# Patient Record
Sex: MALE | Race: BLACK OR AFRICAN AMERICAN | Employment: OTHER | URBAN - METROPOLITAN AREA
[De-identification: names, ages, dates, MRNs, and addresses within clinical notes are randomized per-mention and may not be internally consistent; named-entity substitution may affect disease eponyms.]

---

## 2017-12-13 ENCOUNTER — HOSPITAL ENCOUNTER (OUTPATIENT)
Dept: PHYSICAL THERAPY | Age: 52
Discharge: HOME | End: 2017-12-13
Payer: COMMERCIAL

## 2017-12-13 PROCEDURE — 97016 VASOPNEUMATIC DEVICE THERAPY: CPT

## 2017-12-13 PROCEDURE — 97161 PT EVAL LOW COMPLEX 20 MIN: CPT

## 2017-12-13 PROCEDURE — 97110 THERAPEUTIC EXERCISES: CPT

## 2017-12-13 NOTE — PROGRESS NOTES
Ambulatory/Rehab Services H2 Model Falls Risk Assessment    Risk Factor Pts. ·   Confusion/Disorientation/Impulsivity  []    4 ·   Symptomatic Depression  []   2 ·   Altered Elimination  []   1 ·   Dizziness/Vertigo  []   1 ·   Gender (Male)  [x]   1 ·   Any administered antiepileptics (anticonvulsants):  []   2 ·   Any administered benzodiazepines:  []   1 ·   Visual Impairment (specify):  []   1 ·   Portable Oxygen Use  []   1 ·   Orthostatic ? BP  []   1 ·   History of Recent Falls (within 3 mos.)  []   5     Ability to Rise from Chair (choose one) Pts. ·   Ability to rise in a single movement  []   0 ·   Pushes up, successful in one attempt  []   1 ·   Multiple attempts, but successful  []   3 ·   Unable to rise without assistance  []   4   Total: (5 or greater = High Risk) 1     Falls Prevention Plan:   []                Physical Limitations to Exercise (specify):   []                Mobility Assistance Device (type):   []                Exercise/Equipment Adaptation (specify):    ©2010 [facility] Patent #[id number].  Federal Law prohibits the replication, distribution or use without written permission from [facility]

## 2017-12-13 NOTE — THERAPY EVALUATION
Aba Lloyd Show  : 1965 [facility]  [address], [address], [place] [address]  [phone]   [phone]        OUTPATIENT PHYSICAL THERAPY:Initial Assessment 2017   ICD-10: Treatment Diagnosis: Pain in left shoulder (M25.512)  Precautions/Allergies:   Review of patient's allergies indicates no known allergies. Fall Risk Score: 1 (? 5 = High Risk)  MD Orders: evaluate and treat: HEP MEDICAL/REFERRING DIAGNOSIS:  shoulder, left   DATE OF ONSET: --  REFERRING PHYSICIAN: Eneida Torres MD  RETURN PHYSICIAN APPOINTMENT: --     INITIAL ASSESSMENT:  Mr. Yajaira Snyder presents with left shoulder pain and associated limitations in strength and functional movement. The order initially stated 1-2 PT visits, he has been scheduled for one follow up PT visit. Depending on his progress he may benefit from continuing physical therapy 1-2 times per week for 5 more weeks for further physical therapy visits to address dysfunctions and pain. PROBLEM LIST (Impacting functional limitations):  1. Decreased Strength  2. Decreased ADL/Functional Activities  3. Increased Pain  4. Decreased [place] with Home Exercise Program INTERVENTIONS PLANNED:  1. Home Exercise Program (HEP)  2. Manual Therapy including joint and soft tissue manipulation and mobilization, and dry needling  3. Therapeutic Exercise/Strengthening  4. Modalities including heat/cold application and electrical stimulation   TREATMENT PLAN:  Effective Dates: 17 TO 18. Frequency/Duration: 1-2 time a week for 5 weeks  GOALS: (Goals have been discussed and agreed upon with patient.)  Short-Term Functional Goals: Time Frame: 5 weeks  1. Pt will be independent with > or = 2 exercises in HEP. 2. Pt will report < or = 28 on DASH. Discharge Goals: Time Frame: 10 weeks  1. Pt will be independent with > or = 4 exercises in HEP. 2. Pt will report < or = 19 on DASH.    Rehabilitation Potential For Stated Goals: Good  Regarding Aba Vega's therapy, I certify that the treatment plan above will be carried out by a therapist or under their direction. Thank you for this referral,  Lolis Ferreira, PT, DPT                 The information in this section was collected on 12/13/17 (except where otherwise noted). HISTORY:   History of Present Injury/Illness (Reason for Referral):  Pt reports previous history of L AC separation in 1991. Has recently started getting left shoulder pain that affects sleep and is worse at night. Received a cortisone injection on 12/12/17. Had R rotator cuff sx 2001. Has had an X-ray, no MRI. The shoulder pain affects sleep and general functional activities. Past Medical History/Comorbidities:   Mr. Ousmane Balbuena  has a past medical history of Hypertension and Migraine. He also has no past medical history of Arrhythmia; Arthritis; Asthma; Autoimmune disease ([address] CAD (coronary artery disease); Cancer ([address] Chronic kidney disease; Chronic pain; Coagulation defects; COPD; Diabetes ([address] GERD (gastroesophageal reflux disease); Heart failure ([address] Liver disease; Other ill-defined conditions(799.89); Psychiatric disorder; PUD (peptic ulcer disease); Seizures ([address] Stroke [facility]); Thromboembolus ([address] or Thyroid disease. Mr. Ousmane Balbuena  has a past surgical history that includes orthopaedic.   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 Domestic Life  3. Community, Social and [place]   Number of elements (examined above) that affect the Plan of Care: 1-2: LOW COMPLEXITY   CLINICAL PRESENTATION:   Presentation: Stable and uncomplicated: LOW COMPLEXITY   CLINICAL DECISION MAKING:   Outcome Measure: Tool Used: Disabilities of the Arm, Shoulder and Hand (DASH) Questionnaire - Quick Version  Score:  Initial: 33/55  Most Recent: X/55 (Date: -- )   Interpretation of Score: The DASH is designed to measure the activities of daily living in person's with upper extremity dysfunction or pain. Each section is scored on a 1-5 scale, 5 representing the greatest disability. The scores of each section are added together for a total score of 55. Score 11 12-19 20-28 29-37 38-45 46-54 55   Modifier CH CI CJ CK CL CM CN       Medical Necessity:   · Skilled intervention continues to be required due to decreased function. Reason for Services/Other Comments:  · Patient continues to require skilled intervention due to decreased function. Use of outcome tool(s) and clinical judgement create a POC that gives a: Clear prediction of patient's progress: LOW COMPLEXITY            TREATMENT:   (In addition to Assessment/Re-Assessment sessions the following treatments were rendered)  Pre-treatment Symptoms/Complaints:  Low level pain sitting during intake, increased pain with movement  Pain: Initial:     2/10 Post Session:  0/10     THERAPEUTIC EXERCISE: (15 minutes):  Exercises per grid below to improve mobility and strength. Date:  12/13 Date:   Date:     Activity/Exercise Parameters Parameters Parameters   Patient education Sleeping posture     SL ER Focus on eccentric lowering  X 10                                       MANUAL THERAPY: ( minutes):  To increase motion and reduce pain    MODALITIES: ( 10 minutes):   - GameReady to L shoulder medium compression     Treatment/Session Assessment:    · Response to Treatment:  Pt demonstrated understanding of exercise, pain came on after 5 repetitions, tolerated 10 repetitions. · Compliance with Program/Exercises: Will assess as treatment progresses. · Recommendations/Intent for next treatment session: \"Next visit will focus on advancements to more challenging activities\".   Total Treatment Duration:  PT Patient Time In/Time Out  Time In: 1440  Time Out: 1630    Future Appointments  Date Time Provider Gene Vilma   12/27/2017 2:15 PM Ankush Stands, PT, DPT [facility]   2/21/2018 8:00 AM Real Scales MD [facility]       Ankush Edouard, PT, DPT

## 2017-12-27 ENCOUNTER — HOSPITAL ENCOUNTER (OUTPATIENT)
Dept: PHYSICAL THERAPY | Age: 52
Discharge: HOME | End: 2017-12-27
Payer: COMMERCIAL

## 2017-12-27 NOTE — PROGRESS NOTES
Aba Webb  : 1965  Primary[facility] [address] [facility], [place] [address]  [phone]   [phone]      Aba TEMPLE Jhony Trinidad cancelled physical therapy appointment this pm, to call back to reschedule.      Jen Rose PT, DPT    Future Appointments  Date Time Provider Gene Esparza   2017 7:00 PM Jen Rose PT, DPT [facility]   2018 8:00 AM [facility]

## 2018-03-12 NOTE — THERAPY EVALUATION
Aba Tamica Ricki  : 1965 [facility] [address], [place] [address]  [phone]   [phone]        OUTPATIENT PHYSICAL THERAPY:Discharge 3/12/2018   ICD-10: Treatment Diagnosis: Pain in left shoulder (M25.512)  Precautions/Allergies:   Review of patient's allergies indicates no known allergies. Fall Risk Score: 1 (? 5 = High Risk)  MD Orders: evaluate and treat: HEP MEDICAL/REFERRING DIAGNOSIS:  shoulder, left   DATE OF ONSET: --  REFERRING PHYSICIAN: Mojgan Lai MD  RETURN PHYSICIAN APPOINTMENT: --     INITIAL ASSESSMENT:  Mr. Virgie Briggs attended initial evaluation on 17 for left shoulder pain. He was taught a HEP. He cancelled all follow up visits. Plan of care is discharged. TREATMENT PLAN:  Discharge.    Thank you for this referral,  Mi Banerjee, PT, DPT

## 2018-05-03 ENCOUNTER — ANESTHESIA EVENT (OUTPATIENT)
Dept: SURGERY | Age: 53
End: 2018-05-03
Payer: COMMERCIAL

## 2018-05-04 ENCOUNTER — HOSPITAL ENCOUNTER (OUTPATIENT)
Age: 53
Setting detail: OUTPATIENT SURGERY
Discharge: HOME | End: 2018-05-04
Attending: ORTHOPAEDIC SURGERY | Admitting: ORTHOPAEDIC SURGERY
Payer: COMMERCIAL

## 2018-05-04 ENCOUNTER — ANESTHESIA (OUTPATIENT)
Dept: SURGERY | Age: 53
End: 2018-05-04
Payer: COMMERCIAL

## 2018-05-04 VITALS
BODY MASS INDEX: 27.04 KG/M2 | TEMPERATURE: 97.6 F | HEART RATE: 70 BPM | SYSTOLIC BLOOD PRESSURE: 118 MMHG | DIASTOLIC BLOOD PRESSURE: 67 MMHG | OXYGEN SATURATION: 100 % | HEIGHT: 73 IN | RESPIRATION RATE: 15 BRPM | WEIGHT: 204 LBS

## 2018-05-04 LAB — POTASSIUM BLD-SCNC: 3.7 MMOL/L (ref 3.5–5.1)

## 2018-05-04 PROCEDURE — 77030006668 HC BLD SHV MENSCS STRY -B: Performed by: ORTHOPAEDIC SURGERY

## 2018-05-04 PROCEDURE — 77030003602 HC NDL NRV BLK BBMI -B: Performed by: ANESTHESIOLOGY

## 2018-05-04 PROCEDURE — 77030020143 HC AIRWY LARYN INTUB CGAS -A: Performed by: ANESTHESIOLOGY

## 2018-05-04 PROCEDURE — 77030003666 HC NDL SPINAL BD -A: Performed by: ORTHOPAEDIC SURGERY

## 2018-05-04 PROCEDURE — 77030019605: Performed by: ORTHOPAEDIC SURGERY

## 2018-05-04 PROCEDURE — 76942 ECHO GUIDE FOR BIOPSY: CPT | Performed by: ORTHOPAEDIC SURGERY

## 2018-05-04 PROCEDURE — 74011250637 HC RX REV CODE- 250/637: Performed by: ANESTHESIOLOGY

## 2018-05-04 PROCEDURE — 77030032490 HC SLV COMPR SCD KNE COVD -B: Performed by: ORTHOPAEDIC SURGERY

## 2018-05-04 PROCEDURE — 74011250636 HC RX REV CODE- 250/636: Performed by: ANESTHESIOLOGY

## 2018-05-04 PROCEDURE — 77030033005 HC TBNG ARTHSC PMP STRY -B: Performed by: ORTHOPAEDIC SURGERY

## 2018-05-04 PROCEDURE — 76210000063 HC OR PH I REC FIRST 0.5 HR: Performed by: ORTHOPAEDIC SURGERY

## 2018-05-04 PROCEDURE — 77030018673: Performed by: ORTHOPAEDIC SURGERY

## 2018-05-04 PROCEDURE — 77030027384 HC PRB ARTHSCP SERFAS STRY -C: Performed by: ORTHOPAEDIC SURGERY

## 2018-05-04 PROCEDURE — 77030018836 HC SOL IRR NACL ICUM -A: Performed by: ORTHOPAEDIC SURGERY

## 2018-05-04 PROCEDURE — 74011000250 HC RX REV CODE- 250

## 2018-05-04 PROCEDURE — 84132 ASSAY OF SERUM POTASSIUM: CPT

## 2018-05-04 PROCEDURE — 74011250636 HC RX REV CODE- 250/636

## 2018-05-04 PROCEDURE — 76210000020 HC REC RM PH II FIRST 0.5 HR: Performed by: ORTHOPAEDIC SURGERY

## 2018-05-04 PROCEDURE — 77030004453 HC BUR SHV STRY -B: Performed by: ORTHOPAEDIC SURGERY

## 2018-05-04 PROCEDURE — 76010000160 HC OR TIME 0.5 TO 1 HR INTENSV-TIER 1: Performed by: ORTHOPAEDIC SURGERY

## 2018-05-04 PROCEDURE — 74011250636 HC RX REV CODE- 250/636: Performed by: ORTHOPAEDIC SURGERY

## 2018-05-04 PROCEDURE — 77030033073 HC TBNG ARTHSC PMP OUTFLO STRY -B: Performed by: ORTHOPAEDIC SURGERY

## 2018-05-04 PROCEDURE — C1713 ANCHOR/SCREW BN/BN,TIS/BN: HCPCS | Performed by: ORTHOPAEDIC SURGERY

## 2018-05-04 PROCEDURE — 77030010430: Performed by: ORTHOPAEDIC SURGERY

## 2018-05-04 PROCEDURE — 76010010054 HC POST OP PAIN BLOCK: Performed by: ORTHOPAEDIC SURGERY

## 2018-05-04 PROCEDURE — 76060000033 HC ANESTHESIA 1 TO 1.5 HR: Performed by: ORTHOPAEDIC SURGERY

## 2018-05-04 PROCEDURE — 77030034139 HC NDL ENDOSC TRUPASS SGL S&N -C: Performed by: ORTHOPAEDIC SURGERY

## 2018-05-04 PROCEDURE — 77030002933 HC SUT MCRYL J&J -A: Performed by: ORTHOPAEDIC SURGERY

## 2018-05-04 PROCEDURE — 77030010427: Performed by: ORTHOPAEDIC SURGERY

## 2018-05-04 DEVICE — MULTIFIX S-ULTRA 5.5MM KNOTLESS ANCHOR
Type: IMPLANTABLE DEVICE | Site: SHOULDER | Status: FUNCTIONAL
Brand: MULTIFIX

## 2018-05-04 RX ORDER — ALBUTEROL SULFATE 0.83 MG/ML
2.5 SOLUTION RESPIRATORY (INHALATION) AS NEEDED
Status: DISCONTINUED | OUTPATIENT
Start: 2018-05-04 | End: 2018-05-04 | Stop reason: HOSPADM

## 2018-05-04 RX ORDER — MIDAZOLAM HYDROCHLORIDE 1 MG/ML
2 INJECTION, SOLUTION INTRAMUSCULAR; INTRAVENOUS
Status: COMPLETED | OUTPATIENT
Start: 2018-05-04 | End: 2018-05-04

## 2018-05-04 RX ORDER — FENTANYL CITRATE 50 UG/ML
INJECTION, SOLUTION INTRAMUSCULAR; INTRAVENOUS AS NEEDED
Status: DISCONTINUED | OUTPATIENT
Start: 2018-05-04 | End: 2018-05-04 | Stop reason: HOSPADM

## 2018-05-04 RX ORDER — ONDANSETRON 2 MG/ML
INJECTION INTRAMUSCULAR; INTRAVENOUS AS NEEDED
Status: DISCONTINUED | OUTPATIENT
Start: 2018-05-04 | End: 2018-05-04 | Stop reason: HOSPADM

## 2018-05-04 RX ORDER — SODIUM CHLORIDE, SODIUM LACTATE, POTASSIUM CHLORIDE, CALCIUM CHLORIDE 600; 310; 30; 20 MG/100ML; MG/100ML; MG/100ML; MG/100ML
1000 INJECTION, SOLUTION INTRAVENOUS CONTINUOUS
Status: DISCONTINUED | OUTPATIENT
Start: 2018-05-04 | End: 2018-05-04 | Stop reason: HOSPADM

## 2018-05-04 RX ORDER — ROPIVACAINE HYDROCHLORIDE 5 MG/ML
INJECTION, SOLUTION EPIDURAL; INFILTRATION; PERINEURAL AS NEEDED
Status: DISCONTINUED | OUTPATIENT
Start: 2018-05-04 | End: 2018-05-04 | Stop reason: HOSPADM

## 2018-05-04 RX ORDER — HYDROMORPHONE HYDROCHLORIDE 2 MG/ML
0.5 INJECTION, SOLUTION INTRAMUSCULAR; INTRAVENOUS; SUBCUTANEOUS
Status: DISCONTINUED | OUTPATIENT
Start: 2018-05-04 | End: 2018-05-04 | Stop reason: HOSPADM

## 2018-05-04 RX ORDER — CEFAZOLIN SODIUM/WATER 2 G/20 ML
2 SYRINGE (ML) INTRAVENOUS
Status: DISCONTINUED | OUTPATIENT
Start: 2018-05-04 | End: 2018-05-04 | Stop reason: HOSPADM

## 2018-05-04 RX ORDER — PROPOFOL 10 MG/ML
INJECTION, EMULSION INTRAVENOUS AS NEEDED
Status: DISCONTINUED | OUTPATIENT
Start: 2018-05-04 | End: 2018-05-04 | Stop reason: HOSPADM

## 2018-05-04 RX ORDER — ACETAMINOPHEN 500 MG
1000 TABLET ORAL ONCE
Status: COMPLETED | OUTPATIENT
Start: 2018-05-04 | End: 2018-05-04

## 2018-05-04 RX ORDER — EPINEPHRINE 1 MG/ML
INJECTION, SOLUTION, CONCENTRATE INTRAVENOUS AS NEEDED
Status: DISCONTINUED | OUTPATIENT
Start: 2018-05-04 | End: 2018-05-04 | Stop reason: HOSPADM

## 2018-05-04 RX ORDER — SODIUM CHLORIDE 0.9 % (FLUSH) 0.9 %
5-10 SYRINGE (ML) INJECTION AS NEEDED
Status: DISCONTINUED | OUTPATIENT
Start: 2018-05-04 | End: 2018-05-04 | Stop reason: HOSPADM

## 2018-05-04 RX ORDER — ONDANSETRON 2 MG/ML
4 INJECTION INTRAMUSCULAR; INTRAVENOUS
Status: DISCONTINUED | OUTPATIENT
Start: 2018-05-04 | End: 2018-05-04 | Stop reason: HOSPADM

## 2018-05-04 RX ORDER — DEXAMETHASONE SODIUM PHOSPHATE 4 MG/ML
INJECTION, SOLUTION INTRA-ARTICULAR; INTRALESIONAL; INTRAMUSCULAR; INTRAVENOUS; SOFT TISSUE AS NEEDED
Status: DISCONTINUED | OUTPATIENT
Start: 2018-05-04 | End: 2018-05-04 | Stop reason: HOSPADM

## 2018-05-04 RX ORDER — LIDOCAINE HYDROCHLORIDE 10 MG/ML
0.1 INJECTION INFILTRATION; PERINEURAL AS NEEDED
Status: DISCONTINUED | OUTPATIENT
Start: 2018-05-04 | End: 2018-05-04 | Stop reason: HOSPADM

## 2018-05-04 RX ORDER — SODIUM CHLORIDE 0.9 % (FLUSH) 0.9 %
5-10 SYRINGE (ML) INJECTION EVERY 8 HOURS
Status: DISCONTINUED | OUTPATIENT
Start: 2018-05-04 | End: 2018-05-04 | Stop reason: HOSPADM

## 2018-05-04 RX ORDER — LIDOCAINE HYDROCHLORIDE 20 MG/ML
INJECTION, SOLUTION EPIDURAL; INFILTRATION; INTRACAUDAL; PERINEURAL AS NEEDED
Status: DISCONTINUED | OUTPATIENT
Start: 2018-05-04 | End: 2018-05-04 | Stop reason: HOSPADM

## 2018-05-04 RX ADMIN — MIDAZOLAM HYDROCHLORIDE 2 MG: 1 INJECTION, SOLUTION INTRAMUSCULAR; INTRAVENOUS at 06:48

## 2018-05-04 RX ADMIN — SODIUM CHLORIDE, SODIUM LACTATE, POTASSIUM CHLORIDE, AND CALCIUM CHLORIDE: 600; 310; 30; 20 INJECTION, SOLUTION INTRAVENOUS at 08:32

## 2018-05-04 RX ADMIN — PROPOFOL 200 MG: 10 INJECTION, EMULSION INTRAVENOUS at 07:45

## 2018-05-04 RX ADMIN — LIDOCAINE HYDROCHLORIDE 100 MG: 20 INJECTION, SOLUTION EPIDURAL; INFILTRATION; INTRACAUDAL; PERINEURAL at 07:45

## 2018-05-04 RX ADMIN — SODIUM CHLORIDE, SODIUM LACTATE, POTASSIUM CHLORIDE, AND CALCIUM CHLORIDE 1000 ML: 600; 310; 30; 20 INJECTION, SOLUTION INTRAVENOUS at 06:00

## 2018-05-04 RX ADMIN — ACETAMINOPHEN 1000 MG: 500 TABLET, FILM COATED ORAL at 06:05

## 2018-05-04 RX ADMIN — FENTANYL CITRATE 50 MCG: 50 INJECTION, SOLUTION INTRAMUSCULAR; INTRAVENOUS at 07:44

## 2018-05-04 RX ADMIN — FENTANYL CITRATE 50 MCG: 50 INJECTION, SOLUTION INTRAMUSCULAR; INTRAVENOUS at 07:57

## 2018-05-04 RX ADMIN — ONDANSETRON 4 MG: 2 INJECTION INTRAMUSCULAR; INTRAVENOUS at 07:58

## 2018-05-04 RX ADMIN — DEXAMETHASONE SODIUM PHOSPHATE 4 MG: 4 INJECTION, SOLUTION INTRA-ARTICULAR; INTRALESIONAL; INTRAMUSCULAR; INTRAVENOUS; SOFT TISSUE at 07:58

## 2018-05-04 RX ADMIN — ROPIVACAINE HYDROCHLORIDE 15 ML: 5 INJECTION, SOLUTION EPIDURAL; INFILTRATION; PERINEURAL at 06:53

## 2018-05-04 NOTE — ANESTHESIA POSTPROCEDURE EVALUATION
Post-Anesthesia Evaluation and Assessment    Patient: Julieta Matta MRN: [id number]  SSN: [id number]    YOB: 1965  Age: 46 y.o. Sex: male       Cardiovascular Function/Vital Signs  Visit Vitals    /68    Pulse 72    Temp 36.4 °C (97.6 °F)    Resp 20    Ht 6' 1\" (1.854 m)    Wt 92.5 kg (204 lb)    SpO2 100%    BMI 26.91 kg/m2       Patient is status post general anesthesia for Procedure(s):  LEFT SHOULDER ARTHROSCOPY ROTATOR CUFF REPAIR, DECOMPRESSION, DISTEL CLAVICAL RESECTION. Nausea/Vomiting: None    Postoperative hydration reviewed and adequate. Pain:  Pain Scale 1: Numeric (0 - 10) (05/04/18 0845)  Pain Intensity 1: 0 (05/04/18 0845)   Managed    Neurological Status:   Neuro (WDL): Within Defined Limits (05/04/18 0845)   At baseline    Mental Status and Level of Consciousness: Arousable    Pulmonary Status:   O2 Device: Nasal cannula (05/04/18 0845)   Adequate oxygenation and airway patent    Complications related to anesthesia: None    Post-anesthesia assessment completed.  No concerns    Signed By: Wilmar Avilez MD     May 4, 2018

## 2018-05-04 NOTE — DISCHARGE INSTRUCTIONS
ACTIVITY  · As tolerated and as directed by your doctor. · Bathe or shower as directed by your doctor. DIET  · Clear liquids until no nausea or vomiting; then light diet for the first day. · Advance to regular diet on second day, unless your doctor orders otherwise. · If nausea and vomiting continues, call your doctor. PAIN  · Take pain medication as directed by your doctor. · Call your doctor if pain is NOT relieved by medication. · DO NOT take aspirin of blood thinners unless directed by your doctor. DRESSING CARE       CALL YOUR DOCTOR IF   · Excessive bleeding that does not stop after holding pressure over the area  · Temperature of 101 degrees F or above  · Excessive redness, swelling or bruising, and/ or green or yellow, smelly discharge from incision    AFTER ANESTHESIA   · For the first 24 hours: DO NOT Drive, Drink alcoholic beverages, or Make important decisions. · Be aware of dizziness following anesthesia and while taking pain medication. APPOINTMENT DATE/ TIME    YOUR DOCTOR'S PHONE NUMBER       DISCHARGE SUMMARY from Nurse    PATIENT INSTRUCTIONS:    After general anesthesia or intravenous sedation, for 24 hours or while taking prescription Narcotics:  · Limit your activities  · Do not drive and operate hazardous machinery  · Do not make important personal or business decisions  · Do  not drink alcoholic beverages  · If you have not urinated within 8 hours after discharge, please contact your surgeon on call. *  Please give a list of your current medications to your Primary Care Provider. *  Please update this list whenever your medications are discontinued, doses are      changed, or new medications (including over-the-counter products) are added. *  Please carry medication information at all times in case of emergency situations.       These are general instructions for a healthy lifestyle:    No smoking/ No tobacco products/ Avoid exposure to second hand smoke    Surgeon General's Warning:  Quitting smoking now greatly reduces serious risk to your health. Obesity, smoking, and sedentary lifestyle greatly increases your risk for illness    A healthy diet, regular physical exercise & weight monitoring are important for maintaining a healthy lifestyle    You may be retaining fluid if you have a history of heart failure or if you experience any of the following symptoms:  Weight gain of 3 pounds or more overnight or 5 pounds in a week, increased swelling in our hands or feet or shortness of breath while lying flat in bed. Please call your doctor as soon as you notice any of these symptoms; do not wait until your next office visit. Recognize signs and symptoms of STROKE:    F-face looks uneven    A-arms unable to move or move unevenly    S-speech slurred or non-existent    T-time-call 911 as soon as signs and symptoms begin-DO NOT go       Back to bed or wait to see if you get better-TIME IS BRAIN. Post-Operative Instructions   For  Shoulder Arthroscopy Rotator Cuff Repair  Phone:  [phone]    1. If you do not have an \"Iceman\" type cooling unit, for the first 48-72 hours following surgery, use ice on the shoulder every two hours (while awake) for 20-30 minutes at a time to help prevent swelling and lessen pain. If you have a cooling unit, follow the instructions given to you- continually as much as possible the first 48-72 hours, then 3-4 times a day for 4 weeks. 2. You may shower after the arthroscopy. Keep dressings in place for the first three days. After three days, you may remove the dressings and leave the steri-strip bandages in place; they will peel off naturally. 3. Stay in sling at all times except to shower. 4. Begin therapy as ordered. 5. Use any pain medication as instructed. You should take your pain medication as soon as you feel the anesthetic wearing off.   Do not wait until you are in severe pain to begin taking your pain medication. 6. You may have some side effects from your pain medication. If you have nausea, try taking your medication with food. For itching, you may take over the counter Benadryl. 7. You may have been given a prescription for Zofran or Phenergan. This medication is used for nausea and vomiting. You do not need to get this prescription filled unless you have a problem. 8. If you have a problem, please call [address] [place] [address] at [phone]    [address] [place] [address], P.A.

## 2018-05-04 NOTE — ANESTHESIA PREPROCEDURE EVALUATION
Anesthetic History   No history of anesthetic complications            Review of Systems / Medical History  Patient summary reviewed and pertinent labs reviewed    Pulmonary  Within defined limits                 Neuro/Psych         Headaches     Cardiovascular    Hypertension              Exercise tolerance: >4 METS     GI/Hepatic/Renal                Endo/Other        Arthritis     Other Findings              Physical Exam    Airway  Mallampati: II  TM Distance: 4 - 6 cm  Neck ROM: normal range of motion   Mouth opening: Normal     Cardiovascular    Rhythm: regular  Rate: normal      Pertinent negatives: No murmur, JVD and peripheral edema   Dental  No notable dental hx       Pulmonary  Breath sounds clear to auscultation               Abdominal         Other Findings            Anesthetic Plan    ASA: 2  Anesthesia type: general      Post-op pain plan if not by surgeon: peripheral nerve block single    Induction: Intravenous  Anesthetic plan and risks discussed with: Patient

## 2018-05-04 NOTE — OP NOTES
[address]  OPERATIVE REPORT    Capri Gonzales  MR#: [id number]  : 1965  ACCOUNT #: [de-identified]   DATE OF SERVICE: 2018    PREOPERATIVE DIAGNOSES:  1. Rotator cuff tear. 2.  Acromioclavicular arthritis. 3.  Partial intra-articular long head biceps tear. 4. Inferior and posterior labral tearing. 5.  Chondromalacia humeral head. 6.  Impingement, left shoulder. POSTOPERATIVE DIAGNOSES:  1. Rotator cuff tear. 2.  Acromioclavicular arthritis. 3.  Partial intra-articular long head biceps tear. 4. Inferior and posterior labral tearing. 5.  Chondromalacia humeral head. 6.  Impingement, left shoulder. OPERATION PERFORMED:  1. Arthroscopic rotator cuff repair. 2.  Arthroscopic distal clavicle excision (Ana procedure). 3.  Debridement of partial intra-articular long head biceps tear. 4.  Debridement of labral tearing. 5.  Chondroplasty humeral head. 6.  Arthroscopic subacromial decompression, left shoulder. SURGEON:  Darius Guerrero MD    ASSISTANT:  DEVON Santos    ANESTHESIA:  General.    FLUIDS:  Crystalloid. ESTIMATED BLOOD LOSS:  Minimal.    SPECIMENS REMOVED:  None. COMPLICATIONS:  None. IMPLANTS:  Yes. See record. FINDINGS:  There was a complete tear of the rotator cuff. This was approximately 2 cm. Minimal retraction, excellent excursion. There was intra-articular tearing of the long head of the biceps encompassing 20% of the thickness of the tendon. There was some posterior and inferior labral tearing. There was some grade II-III chondromalacia of the humeral head, 1 x 1.5 cm. There was an anterior inferior acromial slope. There was some degenerative change of the distal clavicle at the [facility] joint. DESCRIPTION OF PROCEDURE:  After informed consent, the patient was taken to the operating room and placed on table in supine position. General endotracheal anesthesia administered without difficulty. Patient was placed in lateral decubitus position. All pressure points were padded appropriately and the left upper extremity suspended in traction. Left shoulder was prepped and draped in sterile fashion. Glenohumeral joint was insufflated with 50 mL of sterile saline and a posterior portal was established. Glenohumeral joint was then arthroscoped in a sequential manner. The aforementioned findings were noted. An anterior portal was established. Labral tearing was debrided smooth. All loose flaps of tissue were removed. There were no sharp edges or unstable fragments after labrum debridement. In a similar manner, the long head biceps tendons were debrided back to a smooth stable area. There were no sharp edges or unstable fragments after the long head biceps debridement. A chondroplasty of the humeral head was performed back to a smooth stable rim. There were no sharp edges or unstable fragments after the chondroplasty. The undersurface rotator cuff tearing was debrided back to a smooth stable rim. The scope was brought into the subacromial space. A lateral portal was established. Bursa proliferative tissue was excised. The undersurface of the acromion was exposed and an acromioplasty was performed. All the acromion anterior leading edge of distal clavicle was excised a depth of 5-6 mm and a 2 cm anterior to posterior direction was removed. This opened up the subacromial space nicely. Attention was then directed to the distal clavicle. Both the anterolateral portal and a circumferential distal clavicle excision was performed, approximately 10 mm of distal clavicle was excised. The Ana procedure was performed without difficulty. Attention was directed to the rotator cuff. The tear was well easily mobilized back to its original insertion. The area underneath the original insertion was lightly decorticated.   Two Multifix anchors and four Ultratape sutures in a mattress fashion were used to complete repair of the tear. Anatomic rotator cuff repair was performed. The rotator cuff tear was repaired without difficulty. Shoulder was thoroughly irrigated. Portals were closed. Sterile dressings were applied. The patient extubated and taken to recovery room in stable condition. DEVON Motley, assisted during the procedure. He was necessary for patient positioning, wound closure, and assistance with the major portions of the operation. His presence decreased the operative time and potential complication rate.       MD SHAW Parker / Dominik Jacobson  D: 05/04/2018 08:35     T: 05/04/2018 08:57  JOB #: [id number]

## 2018-05-04 NOTE — IP AVS SNAPSHOT
[address] 
[phone] Patient: Homero Ryan MRN: [id number] :1965 A check fina indicates which time of day the medication should be taken. My Medications CHANGE how you take these medications Instructions Each Dose to Equal  
 Morning Noon Evening Bedtime  
 valACYclovir 1 gram tablet Commonly known as:  VALTREX What changed:  See the new instructions. Your last dose was: Your next dose is: TAKE 1 TABLET BY MOUTH EVERY 12 HOURS FOR 10 DAYS CONTINUE taking these medications Instructions Each Dose to Equal  
 Morning Noon Evening Bedtime ACETYL-L-CARNITINE Your last dose was: Your next dose is: Take 1 Tab by mouth daily. 1 Tab  
    
   
   
   
  
 aspirin 81 mg tablet Your last dose was: Your next dose is: Take 81 mg by mouth daily. 81 mg  
    
   
   
   
  
 COQ-10 100 mg capsule Generic drug:  co-enzyme Q-10 Your last dose was: Your next dose is: Take 1 Tab by mouth daily. 1 Tab  
    
   
   
   
  
 finasteride 1 mg tablet Commonly known as:  Tomás Garciain Your last dose was: Your next dose is: TAKE 1 TABLET BY MOUTH DAILY FISH OIL 1,000 mg Cap Generic drug:  omega-3 fatty acids-vitamin e Your last dose was: Your next dose is: Take 1,000 mg by mouth daily. 1000 mg  
    
   
   
   
  
 hydroCHLOROthiazide 25 mg tablet Commonly known as:  HYDRODIURIL Your last dose was: Your next dose is: TAKE 1 TABLET BY MOUTH EVERY DAY  
     
   
   
   
  
 MULTIVITAMIN PO Your last dose was: Your next dose is: Take 1 Tab by mouth daily. 1 Tab  
    
   
   
   
  
 potassium 99 mg tablet Your last dose was: Your next dose is: Take 99 mg by mouth daily. 99 mg  
    
   
   
   
  
 quinapril 10 mg tablet Commonly known as:  ACCUPRIL Your last dose was: Your next dose is: TAKE 1 TABLET BY MOUTH EVERY DAY  
     
   
   
   
  
 TYLENOL PM PO Your last dose was: Your next dose is: Take 1 Tab by mouth daily. 1 Tab VIAGRA 100 mg tablet Generic drug:  sildenafil citrate Your last dose was: Your next dose is: TAKE 1 TABLET BY MOUTH EVERY DAY AS NEEDED  
     
   
   
   
  
 VITAMIN C 500 mg tablet Generic drug:  ascorbic acid (vitamin C) Your last dose was: Your next dose is: Take 500 mg by mouth daily.   
 500 mg

## 2018-05-04 NOTE — H&P
Outpatient Surgery History and Physical:  Aba Wood was seen and examined. CHIEF COMPLAINT:   Left shoulder. PE:     Visit Vitals    /86 (BP 1 Location: Right arm, BP Patient Position: At rest)    Pulse 64    Temp 98.7 °F (37.1 °C)    Resp 18    Ht 6' 1\" (1.854 m)    Wt 92.5 kg (204 lb)    SpO2 98%    BMI 26.91 kg/m2       Heart:   Regular rhythm      Lungs:  Are clear      Past Medical History: There are no active problems to display for this patient. Surgical History:   Past Surgical History:   Procedure Laterality Date    HX BACK SURGERY  2005    decompression of sciatic nerve L4-L5    HX KNEE ARTHROSCOPY Right 09/1984    HX ROTATOR CUFF REPAIR Right 2005       Social History: Patient  reports that he has never smoked. He has never used smokeless tobacco. He reports that he drinks about 4.2 oz of alcohol per week     Family History:   Family History   Problem Relation Age of Onset    Migraines Mother     Coronary Artery Disease Father     Heart Disease Father     Heart Attack Father     Heart Surgery Father     Stroke Maternal Grandmother     Cancer Maternal Grandfather      breast       Allergies: Reviewed per EMR  No Known Allergies    Medications:    No current facility-administered medications on file prior to encounter. Current Outpatient Prescriptions on File Prior to Encounter   Medication Sig    hydroCHLOROthiazide (HYDRODIURIL) 25 mg tablet TAKE 1 TABLET BY MOUTH EVERY DAY    finasteride (PROPECIA) 1 mg tablet TAKE 1 TABLET BY MOUTH DAILY    VIAGRA 100 mg tablet TAKE 1 TABLET BY MOUTH EVERY DAY AS NEEDED    valACYclovir (VALTREX) 1 gram tablet TAKE 1 TABLET BY MOUTH EVERY 12 HOURS FOR 10 DAYS (Patient taking differently: TAKE 1 TABLET BY MOUTH EVERY 12 HOURS FOR 10 DAYS+---prn)    aspirin 81 mg tablet Take 81 mg by mouth daily.  MULTIVITAMIN PO Take 1 Tab by mouth daily.     omega-3 fatty acids-vitamin e (FISH OIL) 1,000 mg Cap Take 1,000 mg by mouth daily.  coenzyme q10 (COQ-10) 100 mg Cap Take 1 Tab by mouth daily. The surgery is planned for the left shoulder pain. History and physical has been reviewed. The patient has been examined. There have been no significant clinical changes since the completion of the originally dated History and Physical.  Patient identified by surgeon; surgical site was confirmed by patient and surgeon. The patient is here today for outpatient surgery. I have examined the patient, no changes are noted in the patient's medical status. Necessity for the procedure/care is still present and the history and physical above is current. See the office notes for the full long term history of the problem. Please see the recent office notes for the musculoskeletal examination.     Signed By: DEVON Peterson     May 4, 2018 7:02 AM

## 2018-05-04 NOTE — ANESTHESIA PROCEDURE NOTES
Peripheral Block    Start time: 5/4/2018 6:48 AM  End time: 5/4/2018 6:53 AM  Performed by: Lupis Pagan  Authorized by: Lupis Pagan       Pre-procedure: Indications: at surgeon's request and post-op pain management    Preanesthetic Checklist: patient identified, risks and benefits discussed, site marked, timeout performed, anesthesia consent given and patient being monitored    Timeout Time: 06:48          Block Type:   Block Type:   Interscalene  Laterality:  Left  Monitoring:  Standard ASA monitoring, continuous pulse ox, frequent vital sign checks, heart rate, responsive to questions and oxygen  Injection Technique:  Single shot  Procedures: ultrasound guided    Patient Position: seated  Prep: chlorhexidine    Location:  Interscalene  Needle Type:  Stimuplex  Needle Gauge:  21 G  Needle Localization:  Ultrasound guidance  Medication Injected:  0.5%  ropivacaine  Volume (mL):  15    Assessment:  Number of attempts:  1  Injection Assessment:  Incremental injection every 5 mL, negative aspiration for CSF, ultrasound image on chart, no intravascular symptoms, negative aspiration for blood, local visualized surrounding nerve on ultrasound and no paresthesia  Patient tolerance:  Patient tolerated the procedure well with no immediate complications

## 2018-05-04 NOTE — IP AVS SNAPSHOT
[facility] 
 
 
 [address] 
[phone] Patient: Zula Mohs MRN: [id number] :[id number] About your hospitalization You were admitted on: May 4, 2018 You last received care in the:  [facility] OP PACU You were discharged on: May 4, 2018 Why you were hospitalized Your primary diagnosis was:  Not on File Follow-up Information Follow up With Details Comments Contact Info Aditya Dye MD   [address] [address] [address] [facility] [address] 
[phone] Kareem Heath MD   [address] [address] 
[phone] Discharge Orders None A check fina indicates which time of day the medication should be taken. My Medications CHANGE how you take these medications Instructions Each Dose to Equal  
 Morning Noon Evening Bedtime  
 valACYclovir 1 gram tablet Commonly known as:  VALTREX What changed:  See the new instructions. Your last dose was: Your next dose is: TAKE 1 TABLET BY MOUTH EVERY 12 HOURS FOR 10 DAYS CONTINUE taking these medications Instructions Each Dose to Equal  
 Morning Noon Evening Bedtime ACETYL-L-CARNITINE Your last dose was: Your next dose is: Take 1 Tab by mouth daily. 1 Tab  
    
   
   
   
  
 aspirin 81 mg tablet Your last dose was: Your next dose is: Take 81 mg by mouth daily. 81 mg  
    
   
   
   
  
 COQ-10 100 mg capsule Generic drug:  co-enzyme Q-10 Your last dose was: Your next dose is: Take 1 Tab by mouth daily. 1 Tab  
    
   
   
   
  
 finasteride 1 mg tablet Commonly known as:  Reino Piano Your last dose was: Your next dose is: TAKE 1 TABLET BY MOUTH DAILY FISH OIL 1,000 mg Cap Generic drug:  omega-3 fatty acids-vitamin e Your last dose was: Your next dose is: Take 1,000 mg by mouth daily. 1000 mg  
    
   
   
   
  
 hydroCHLOROthiazide 25 mg tablet Commonly known as:  HYDRODIURIL Your last dose was: Your next dose is: TAKE 1 TABLET BY MOUTH EVERY DAY  
     
   
   
   
  
 MULTIVITAMIN PO Your last dose was: Your next dose is: Take 1 Tab by mouth daily. 1 Tab  
    
   
   
   
  
 potassium 99 mg tablet Your last dose was: Your next dose is: Take 99 mg by mouth daily. 99 mg  
    
   
   
   
  
 quinapril 10 mg tablet Commonly known as:  ACCUPRIL Your last dose was: Your next dose is: TAKE 1 TABLET BY MOUTH EVERY DAY  
     
   
   
   
  
 TYLENOL PM PO Your last dose was: Your next dose is: Take 1 Tab by mouth daily. 1 Tab VIAGRA 100 mg tablet Generic drug:  sildenafil citrate Your last dose was: Your next dose is: TAKE 1 TABLET BY MOUTH EVERY DAY AS NEEDED  
     
   
   
   
  
 VITAMIN C 500 mg tablet Generic drug:  ascorbic acid (vitamin C) Your last dose was: Your next dose is: Take 500 mg by mouth daily. 500 mg Discharge Instructions ACTIVITY · As tolerated and as directed by your doctor. · Bathe or shower as directed by your doctor. DIET · Clear liquids until no nausea or vomiting; then light diet for the first day. · Advance to regular diet on second day, unless your doctor orders otherwise. · If nausea and vomiting continues, call your doctor. PAIN 
· Take pain medication as directed by your doctor. · Call your doctor if pain is NOT relieved by medication. · DO NOT take aspirin of blood thinners unless directed by your doctor. DRESSING CARE  
 
 
CALL YOUR DOCTOR IF  
· Excessive bleeding that does not stop after holding pressure over the area · Temperature of 101 degrees F or above · Excessive redness, swelling or bruising, and/ or green or yellow, smelly discharge from incision AFTER ANESTHESIA · For the first 24 hours: DO NOT Drive, Drink alcoholic beverages, or Make important decisions. · Be aware of dizziness following anesthesia and while taking pain medication. APPOINTMENT DATE/ TIME 
 
YOUR DOCTOR'S PHONE NUMBER  
 
 
DISCHARGE SUMMARY from Nurse PATIENT INSTRUCTIONS: 
 
After general anesthesia or intravenous sedation, for 24 hours or while taking prescription Narcotics: · Limit your activities · Do not drive and operate hazardous machinery · Do not make important personal or business decisions · Do  not drink alcoholic beverages · If you have not urinated within 8 hours after discharge, please contact your surgeon on call. *  Please give a list of your current medications to your Primary Care Provider. *  Please update this list whenever your medications are discontinued, doses are 
    changed, or new medications (including over-the-counter products) are added. *  Please carry medication information at all times in case of emergency situations. These are general instructions for a healthy lifestyle: No smoking/ No tobacco products/ Avoid exposure to second hand smoke Surgeon General's Warning:  Quitting smoking now greatly reduces serious risk to your health. Obesity, smoking, and sedentary lifestyle greatly increases your risk for illness A healthy diet, regular physical exercise & weight monitoring are important for maintaining a healthy lifestyle You may be retaining fluid if you have a history of heart failure or if you experience any of the following symptoms:  Weight gain of 3 pounds or more overnight or 5 pounds in a week, increased swelling in our hands or feet or shortness of breath while lying flat in bed. Please call your doctor as soon as you notice any of these symptoms; do not wait until your next office visit. Recognize signs and symptoms of STROKE: 
 
F-face looks uneven A-arms unable to move or move unevenly S-speech slurred or non-existent T-time-call 911 as soon as signs and symptoms begin-DO NOT go Back to bed or wait to see if you get better-TIME IS BRAIN. Post-Operative Instructions For Shoulder Arthroscopy Rotator Cuff Repair Phone:  [phone] 1. If you do not have an \"Iceman\" type cooling unit, for the first 48-72 hours following surgery, use ice on the shoulder every two hours (while awake) for 20-30 minutes at a time to help prevent swelling and lessen pain. If you have a cooling unit, follow the instructions given to you- continually as much as possible the first 48-72 hours, then 3-4 times a day for 4 weeks. 2. You may shower after the arthroscopy. Keep dressings in place for the first three days. After three days, you may remove the dressings and leave the steri-strip bandages in place; they will peel off naturally. 3. Stay in sling at all times except to shower. 4. Begin therapy as ordered. 5. Use any pain medication as instructed. You should take your pain medication as soon as you feel the anesthetic wearing off. Do not wait until you are in severe pain to begin taking your pain medication. 6. You may have some side effects from your pain medication. If you have nausea, try taking your medication with food. For itching, you may take over the counter Benadryl. 7. You may have been given a prescription for Zofran or Phenergan. This medication is used for nausea and vomiting. You do not need to get this prescription filled unless you have a problem. 8. If you have a problem, please call [address] [place] [address] at [phone] Misbah Jacobsen [employer] Introducing [facility]! [facility] introduces "[employer] patient portal. Now you can access parts of your medical record, email your doctor's office, and request medication refills online. 1. In your internet browser, go to [email or web address] 2. Click on the First Time User? Click Here link in the Sign In box. You will see the New Member Sign Up page. 3. Enter your "[employer] Access Code exactly as it appears below. You will not need to use this code after youve completed the sign-up process. If you do not sign up before the expiration date, you must request a new code. · "[employer] Access Code: [email or web address] Expires: 6/18/2018  2:25 PM 
 
4. Enter the last four digits of your Social Security Number (xxxx) and Date of Birth (mm/dd/yyyy) as indicated and click Submit. You will be taken to the next sign-up page. 5. Create a "[employer] ID. This will be your "[employer] login ID and cannot be changed, so think of one that is secure and easy to remember. 6. Create a "[employer] password. You can change your password at any time. 7. Enter your Password Reset Question and Answer. This can be used at a later time if you forget your password. 8. Enter your e-mail address. You will receive e-mail notification when new information is available in [address] 9. Click Sign Up. You can now view and download portions of your medical record. 10. Click the Download Summary menu link to download a portable copy of your medical information. If you have questions, please visit the Frequently Asked Questions section of the "[employer] website. Remember, "[employer] is NOT to be used for urgent needs. For medical emergencies, dial 911. Now available from your iPhone and Android! Introducing Jake Loyd As a [facility] patient, I wanted to make you aware of our electronic visit tool called Jake Loyd. [employer] 24/7 allows you to connect within minutes with a medical provider 24 hours a day, seven days a week via a mobile device or tablet or logging into a secure website from your computer. You can access [employer] from anywhere in the [place]. A virtual visit might be right for you when you have a simple condition and feel like you just dont want to get out of bed, or cant get away from work for an appointment, when your regular [facility] ED provider is not available (evenings, weekends or holidays), or when youre out of town and need minor care. Electronic visits cost only $49 and if the [employer]/7 provider determines a prescription is needed to treat your condition, one can be electronically transmitted to a nearby pharmacy*. Please take a moment to enroll today if you have not already done so. The enrollment process is free and takes just a few minutes. To enroll, please download the [employer] harmeet to your tablet or phone, or visit [email or web address] to enroll on your computer. And, as an [address] [place] [address] patient with a [employer] account, the results of your visits will be scanned into your electronic medical record and your primary care provider will be able to view the scanned results. We urge you to continue to [facility] your [facility] provider for your ongoing medical care. And while your primary care provider may not be the one available when you seek a [facility] virtual visit, the peace of mind you get from getting a real diagnosis real time can be priceless. For more information on [facility], view our Frequently Asked Questions (FAQs) at [email or web address]. Sincerely, 
 
Shelley Gonzalez MD 
Chief Medical Officer Omkar Oconnell *:  certain medications cannot be prescribed via [employer] Providers Seen During Your Hospitalization Provider Specialty Primary office phone Blessing Castaneda MD Orthopedic Surgery [phone] Your Primary Care Physician (PCP) Primary Care Physician Office Phone Office Fax Yuliana Rush [phone] You are allergic to the following No active allergies Recent Documentation Height Weight BMI Smoking Status 1.854 m 92.5 kg 26.91 kg/m2 Never Smoker Emergency Contacts Name Discharge Info Relation Home Work Mobile Bill Wyatt  Child [2] [phone] Leticia Vega  Spouse [3] [phone] Patient Belongings The following personal items are in your possession at time of discharge: 
  Dental Appliances: None  Visual Aid: Glasses      Home Medications: None   Jewelry: None  Clothing: Shirt, Pants, Footwear, Undergarments, Socks    Other Valuables: None Please provide this summary of care documentation to your next provider. Signatures-by signing, you are acknowledging that this After Visit Summary has been reviewed with you and you have received a copy. Patient Signature:  ____________________________________________________________ Date:  ____________________________________________________________  
  
Angela Pearce Provider Signature:  ____________________________________________________________ Date:  ____________________________________________________________

## 2018-05-11 NOTE — BRIEF OP NOTE
BRIEF OPERATIVE NOTE    Date of Procedure: 5/4/2018   Preoperative Diagnosis: Complete rotator cuff tear of left shoulder [M75.122]  Postoperative Diagnosis: rotator cuff tear, IIMPINGMENT,  of left shoulder    Procedure(s):  LEFT SHOULDER ARTHROSCOPY ROTATOR CUFF REPAIR, DECOMPRESSION, DISTEL CLAVICAL RESECTION  Surgeon(s) and Role: Supa Vega MD - Primary         Surgical Assistant:     Surgical Staff:  Circ-1: Marcelino Coker. [facility]  Physician Assistant: DEVON Morgan  Scrub Tech-1: Yarely Kang  Event Time In   Incision Start 4572   Incision Close 0883     Anesthesia: General   Estimated Blood Loss: min  Specimens: * No specimens in log *   Findings: rtc   Complications: none  Implants:   Implant Name Type Inv.  Item Serial No.  Lot No. LRB No. Used Action   ANCHOR SUT MULTIFIX 5.5MM --  - [id number]   ANCHOR SUT ULTRA PEEK KL 5.5MM -- Betzaida Elsy [employer] [id number] 2 Implanted

## 2018-05-31 ENCOUNTER — HOSPITAL ENCOUNTER (OUTPATIENT)
Dept: PHYSICAL THERAPY | Age: 53
Discharge: HOME | End: 2018-05-31
Payer: COMMERCIAL

## 2018-05-31 PROCEDURE — 97161 PT EVAL LOW COMPLEX 20 MIN: CPT

## 2018-05-31 PROCEDURE — 97140 MANUAL THERAPY 1/> REGIONS: CPT

## 2018-05-31 NOTE — THERAPY EVALUATION
Aba Woods [place]  : 1965  Payor: Faith Jordan / Plan: [employer] / Product Type: HMO /  [address]  at [address], [address] [address]  [phone]   [phone]       OUTPATIENT PHYSICAL THERAPY:Initial Assessment 2018    ICD-10: Treatment Diagnosis: Complete rotator cuff tear or rupture of left shoulder, not specified as traumatic (M75.122), Encounter for other orthopedic aftercare (Z47.89), Pain in left shoulder (M25.512), Stiffness of left shoulder, not elsewhere classified (M25.612), Muscle wasting and atrophy, not elsewhere classified, left shoulder (M62.512)  Precautions/Allergies:   Review of patient's allergies indicates no known allergies. Fall Risk Score: 1 (? 5 = High Risk)  MD Orders: PT eval and treat MEDICAL/REFERRING DIAGNOSIS:  shoulder, left   DATE OF ONSET: surgery 18  REFERRING PHYSICIAN: Sebas Fink MD  RETURN PHYSICIAN APPOINTMENT: 18     ASSESSMENT:  Mr. Amada Franco presents approx 4 weeks s/p s/p left side RTC repair and debridement of bicep long-head, labral, articular cartilage and bursal tissues. Pt has well healing surgical incisions, mild muscle guarding with passive range of motion, muscle atrophy, tightened and shortened pectoral muscles on the L side, and decreases in tolerated shoulder range of motion as expected. Pt is currently still wearing a sling and can use his arm/hand out of sling so long as he does not lift his arm. Pt will benefit from skilled PT to regain full function of his L arm. PROBLEM LIST (Impacting functional limitations):  1. Decreased Strength  2. Decreased ADL/Functional Activities  3. Increased Pain  4. Decreased Activity Tolerance  5. Decreased Flexibility/Joint Mobility  6. Edema/Girth INTERVENTIONS PLANNED:  1. Cold  2. Continuous Passive Motion (CPM)  3. Electrical Stimulation  4. Heat  5. Home Exercise Program (HEP)  6.  Manual Therapy  7. Neuromuscular Re-education/Strengthening  8. Range of Motion (ROM)  9. Therapeutic Activites  10. Therapeutic Exercise/Strengthening     TREATMENT PLAN:  Effective Dates: 5/31/2018 TO 8/30/2018 (90 days). Frequency/Duration: 2 times a week for 10 weeks    GOALS: (Goals have been discussed and agreed upon with patient.)  Short-Term Functional Goals: Time Frame: 5 weeks  1. Pt will be I and compliant with initial HEP  2. Pt will demonstrate minimum 140 degrees AROM L shoulder flexion and abduction  3. Pt will demonstrate at least 75 degrees ER and IR of L shoulder  4. Pt will demonstrate at least 4/5 muscle strength throughout L UE  5. Pt will be able to lift light objects overhead for placing on shelves/cabinets  6. Pt will score no more than 28/50 on Quick DASH  Discharge Goals: Time Frame: 10 weeks  1. Pt will demonstrate full overhead range of motion of L shoulder for ability to lift and carry objects overhead in function  2. Pt will demonstrate WNL B UE strength for ability to lift and carry heavy objects and bags  3. Pt will score no more than 15/50 on Quick DASH indicating excellent function of L UE  4. Pt will be able to lift and carry his child with no restrictions or modifications  5. Pt will participate in gym workouts fully and comply with recommendations for specific HEP  6. Pt will participate in house and yardwork with max 10% restrictions or modifications  Rehabilitation Potential For Stated Goals: Excellent  Regarding Aba Brooks therapy, I certify that the treatment plan above will be carried out by a therapist or under their direction. Thank you for this referral,  Troy Schmitt, PT                 The information in this section was collected on 5/31/18 (except where otherwise noted).   HISTORY:   History of Present Injury/Illness (Reason for Referral): Aba Lylejavier Jerry presents s/p left side RTC repair and debridement of bicep long-head, labral, articular cartilage and bursal tissues. He had been having a gradual onset of pain and went to [facility] to seek treatment; no specific injury. 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 No other significant medical history noted. Currently still wearing a sling, and pt reports that he was instructed by MD to go slow with his rehab to allow for proper healing. Pain location: L shoulder  Pain levels - Current: 0/10, mild increases in pain with movement   Increases pain: Movement   Decreases pain: Rest    Past Medical History/Comorbidities:   Mr. Akash Hensley  has a past medical history of Adverse effect of anesthesia; Chronic pain; Hypertension; and Migraine. Mr. Akash Hensley  has a past surgical history that includes hx knee arthroscopy (Right, 09/1984); hx rotator cuff repair (Right, 2005); and hx back surgery (2005). 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 , Disp: , Rfl:     potassium 99 mg tablet, Take 99 mg by mouth daily. , Disp: , Rfl:     levocarnitine HCl (ACETYL-L-CARNITINE), Take 1 Tab by mouth daily. , Disp: , Rfl:     ascorbic acid, vitamin C, (VITAMIN C) 500 mg tablet, Take 500 mg by mouth daily. , Disp: , Rfl:     hydroCHLOROthiazide (HYDRODIURIL) 25 mg tablet, TAKE 1 TABLET BY MOUTH EVERY DAY, Disp: 90 Tab, Rfl: 0    VIAGRA 100 mg tablet, TAKE 1 TABLET BY MOUTH EVERY DAY AS NEEDED, Disp: 30 Tab, Rfl: 0    valACYclovir (VALTREX) 1 gram tablet, TAKE 1 TABLET BY MOUTH EVERY 12 HOURS FOR 10 DAYS (Patient taking differently: TAKE 1 TABLET BY MOUTH EVERY 12 HOURS FOR 10 DAYS+---prn), Disp: 20 Tab, Rfl: 2    aspirin 81 mg tablet, Take 81 mg by mouth daily. , Disp: , Rfl:     MULTIVITAMIN PO, Take 1 Tab by mouth daily. , Disp: , Rfl:     omega-3 fatty acids-vitamin e (FISH OIL) 1,000 mg Cap, Take 1,000 mg by mouth daily. , Disp: , Rfl:     coenzyme q10 (COQ-10) 100 mg Cap, Take 1 Tab by mouth daily. , Disp: , Rfl:    Date Last Reviewed:  5/31/2018     Number of Personal Factors/Comorbidities that affect the Plan of Care: 0: LOW COMPLEXITY   EXAMINATION:     Observation:  Surgical incisions healing well, acromion is prominent where pt had previous shoulder separation, decreased muscle mass noted of L deltoids and infraspinatus    Postural Assessment:  Rounded forward shoulders, L more than R   Supine: tight pecs noted L>R    Palpation: No tenderness noted grossly to L shoulder    ROM:  - passive  L shoulder flexion: 105  L shoulder ER: 35  L shoulder IR:  65    Strength: Deferred to next progress note       Body Structures Involved:  1. Joints  2. Muscles Body Functions Affected:  1. Neuromusculoskeletal  2. Movement Related Activities and Participation Affected:  1. General Tasks and Demands  2. Mobility  3. Self Care  4. Domestic Life  5. Interpersonal Interactions and Relationships  6. Community, Social and [place]   Number of elements that affect the Plan of Care: 4+: HIGH COMPLEXITY   CLINICAL PRESENTATION:   Presentation: Stable and uncomplicated: LOW COMPLEXITY   CLINICAL DECISION MAKING:   Outcome Measure:    Tool Used: Disabilities of the Arm, Shoulder and Hand (DASH) Questionnaire - Quick Version  Score:  Initial: 40/55  Most Recent: X/55 (Date: -- ) Interpretation of Score: The DASH is designed to measure the activities of daily living in person's with upper extremity dysfunction or pain. Each section is scored on a 1-5 scale, 5 representing the greatest disability. The scores of each section are added together for a total score of 55. Medical Necessity:   · Patient demonstrates good rehab potential due to higher previous functional level. Reason for Services/Other Comments:  · Patient continues to require skilled intervention due to recent rotator cuff surgery impairing the function of the L arm. Use of outcome tool(s) and clinical judgement create a POC that gives a: Clear prediction of patient's progress: LOW COMPLEXITY            TREATMENT:   (In addition to Assessment/Re-Assessment sessions the following treatments were rendered)      Present Symptoms/Complaints - 5/31/2018:  See hx  Pain: Initial:   Pain Intensity 1: 0  Post Session:  A bit sore with movements       Therapeutic Exercise: (3 minutes):  Exercises per grid below to improve mobility, strength and balance. Required moderate visual, verbal and tactile cues to promote proper body alignment, promote proper body posture and promote proper body mechanics. Progressed resistance, range and repetitions as indicated. Date:  5/31/18 Date:   Date:     Activity/Exercise Parameters Parameters Parameters   Pt education Findings, anatomy/pathology, POC, HEP                                             Manual Therapy: (15 minutes): was utilized and necessary because of the patient's restricted joint motion and restricted motion of soft tissue.   - PROM L shoulder flexion, abduction, ER/IR  - Manual pec major and minor stretches    Treatment/Session Assessment:  Pt understood educational interventions and agreed with plan. · Compliance with Program/Exercises: Will assess as treatment progresses.   · Recommendations/Intent for next treatment session: Next visit will focus on advancements to more challenging activities.   · Variance from plan of care: None    Total Treatment Duration:  PT Patient Time In/Time Out  Time In: 1600  Time Out: 120 Yg Lomeli, FERMÍN

## 2018-06-01 NOTE — PROGRESS NOTES
Ambulatory/Rehab Services H2 Model Falls Risk Assessment    Risk Factor Pts. ·   Confusion/Disorientation/Impulsivity  []    4 ·   Symptomatic Depression  []   2 ·   Altered Elimination  []   1 ·   Dizziness/Vertigo  []   1 ·   Gender (Male)  [x]   1 ·   Any administered antiepileptics (anticonvulsants):  []   2 ·   Any administered benzodiazepines:  []   1 ·   Visual Impairment (specify):  []   1 ·   Portable Oxygen Use  []   1 ·   Orthostatic ? BP  []   1 ·   History of Recent Falls (within 3 mos.)  []   5     Ability to Rise from Chair (choose one) Pts. ·   Ability to rise in a single movement  []   0 ·   Pushes up, successful in one attempt  []   1 ·   Multiple attempts, but successful  []   3 ·   Unable to rise without assistance  []   4   Total: (5 or greater = High Risk) 1     Falls Prevention Plan:   []                Physical Limitations to Exercise (specify):   []                Mobility Assistance Device (type):   []                Exercise/Equipment Adaptation (specify):    ©2010 [facility] [address] Patent #[id number].  Federal Law prohibits the replication, distribution or use without written permission from [facility] [employer]

## 2018-06-05 ENCOUNTER — HOSPITAL ENCOUNTER (OUTPATIENT)
Dept: PHYSICAL THERAPY | Age: 53
Discharge: HOME | End: 2018-06-05
Payer: COMMERCIAL

## 2018-06-05 PROCEDURE — 97140 MANUAL THERAPY 1/> REGIONS: CPT

## 2018-06-05 NOTE — PROGRESS NOTES
Aba Urbina  : 1965  Payor: Kandy Ricardokourtney / Plan: [employer] / Product Type: HMO /  [address]  at [facility]  [address], [address], [address] [place] [address]  [phone]   [phone]       OUTPATIENT PHYSICAL THERAPY:Daily Note 2018    ICD-10: Treatment Diagnosis: Complete rotator cuff tear or rupture of left shoulder, not specified as traumatic (M75.122), Encounter for other orthopedic aftercare (Z47.89), Pain in left shoulder (M25.512), Stiffness of left shoulder, not elsewhere classified (M25.612), Muscle wasting and atrophy, not elsewhere classified, left shoulder (M62.512)  Precautions/Allergies:   Review of patient's allergies indicates no known allergies. Fall Risk Score: 1 (? 5 = High Risk)  MD Orders: PT eval and treat MEDICAL/REFERRING DIAGNOSIS:  shoulder, left   DATE OF ONSET: surgery 18  REFERRING PHYSICIAN: Leonel Ormond, MD  RETURN PHYSICIAN APPOINTMENT: 18     ASSESSMENT:  Mr. Selina Valentin presents approx 4 weeks s/p s/p left side RTC repair and debridement of bicep long-head, labral, articular cartilage and bursal tissues. Pt has well healing surgical incisions, mild muscle guarding with passive range of motion, muscle atrophy, tightened and shortened pectoral muscles on the L side, and decreases in tolerated shoulder range of motion as expected. Pt is currently still wearing a sling and can use his arm/hand out of sling so long as he does not lift his arm. Pt will benefit from skilled PT to regain full function of his L arm. PROBLEM LIST (Impacting functional limitations):  1. Decreased Strength  2. Decreased ADL/Functional Activities  3. Increased Pain  4. Decreased Activity Tolerance  5. Decreased Flexibility/Joint Mobility  6. Edema/Girth INTERVENTIONS PLANNED:  1. Cold  2. Continuous Passive Motion (CPM)  3. Electrical Stimulation  4. Heat  5. Home Exercise Program (HEP)  6.  Manual Therapy  7. Neuromuscular Re-education/Strengthening  8. Range of Motion (ROM)  9. Therapeutic Activites  10. Therapeutic Exercise/Strengthening     TREATMENT PLAN:  Effective Dates: 5/31/2018 TO 8/30/2018 (90 days). Frequency/Duration: 2 times a week for 10 weeks    GOALS: (Goals have been discussed and agreed upon with patient.)  Short-Term Functional Goals: Time Frame: 5 weeks  1. Pt will be I and compliant with initial HEP  2. Pt will demonstrate minimum 140 degrees AROM L shoulder flexion and abduction  3. Pt will demonstrate at least 75 degrees ER and IR of L shoulder  4. Pt will demonstrate at least 4/5 muscle strength throughout L UE  5. Pt will be able to lift light objects overhead for placing on shelves/cabinets  6. Pt will score no more than 28/50 on Quick DASH  Discharge Goals: Time Frame: 10 weeks  1. Pt will demonstrate full overhead range of motion of L shoulder for ability to lift and carry objects overhead in function  2. Pt will demonstrate WNL B UE strength for ability to lift and carry heavy objects and bags  3. Pt will score no more than 15/50 on Quick DASH indicating excellent function of L UE  4. Pt will be able to lift and carry his child with no restrictions or modifications  5. Pt will participate in gym workouts fully and comply with recommendations for specific HEP  6. Pt will participate in house and yardwork with max 10% restrictions or modifications  Rehabilitation Potential For Stated Goals: Excellent              The information in this section was collected on 5/31/18 (except where otherwise noted). HISTORY:   History of Present Injury/Illness (Reason for Referral): Aba Barnes presents s/p left side RTC repair and debridement of bicep long-head, labral, articular cartilage and bursal tissues. He had been having a gradual onset of pain and went to [facility] to seek treatment; no specific injury.   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 No other significant medical history noted. Currently still wearing a sling, and pt reports that he was instructed by MD to go slow with his rehab to allow for proper healing. Pain location: L shoulder  Pain levels - Current: 0/10, mild increases in pain with movement   Increases pain: Movement   Decreases pain: Rest    Past Medical History/Comorbidities:   Mr. Jennifer Mcdonnell  has a past medical history of Adverse effect of anesthesia; Chronic pain; Hypertension; and Migraine. Mr. Jennifer Mcdonnell  has a past surgical history that includes hx knee arthroscopy (Right, 09/1984); hx rotator cuff repair (Right, 2005); and hx back surgery (2005). 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 , Disp: , Rfl:     potassium 99 mg tablet, Take 99 mg by mouth daily. , Disp: , Rfl:     levocarnitine HCl (ACETYL-L-CARNITINE), Take 1 Tab by mouth daily. , Disp: , Rfl:     ascorbic acid, vitamin C, (VITAMIN C) 500 mg tablet, Take 500 mg by mouth daily. , Disp: , Rfl:     hydroCHLOROthiazide (HYDRODIURIL) 25 mg tablet, TAKE 1 TABLET BY MOUTH EVERY DAY, Disp: 90 Tab, Rfl: 0    VIAGRA 100 mg tablet, TAKE 1 TABLET BY MOUTH EVERY DAY AS NEEDED, Disp: 30 Tab, Rfl: 0    valACYclovir (VALTREX) 1 gram tablet, TAKE 1 TABLET BY MOUTH EVERY 12 HOURS FOR 10 DAYS (Patient taking differently: TAKE 1 TABLET BY MOUTH EVERY 12 HOURS FOR 10 DAYS+---prn), Disp: 20 Tab, Rfl: 2    aspirin 81 mg tablet, Take 81 mg by mouth daily. , Disp: , Rfl:     MULTIVITAMIN PO, Take 1 Tab by mouth daily. , Disp: , Rfl:     omega-3 fatty acids-vitamin e (FISH OIL) 1,000 mg Cap, Take 1,000 mg by mouth daily. , Disp: , Rfl:     coenzyme q10 (COQ-10) 100 mg Cap, Take 1 Tab by mouth daily. , Disp: , Rfl:    Date Last Reviewed:  6/5/2018     EXAMINATION:     Observation:  Surgical incisions healing well, acromion is prominent where pt had previous shoulder separation, decreased muscle mass noted of L deltoids and infraspinatus    Postural Assessment:  Rounded forward shoulders, L more than R   Supine: tight pecs noted L>R    Palpation: No tenderness noted grossly to L shoulder    ROM:  - passive  L shoulder flexion: 105  L shoulder ER: 35  L shoulder IR:  65    Strength: Deferred to next progress note       CLINICAL DECISION MAKING:   Outcome Measure: Tool Used: Disabilities of the Arm, Shoulder and Hand (DASH) Questionnaire - Quick Version  Score:  Initial: 40/55  Most Recent: X/55 (Date: -- )   Interpretation of Score: The DASH is designed to measure the activities of daily living in person's with upper extremity dysfunction or pain. Each section is scored on a 1-5 scale, 5 representing the greatest disability. The scores of each section are added together for a total score of 55. Medical Necessity:   · Patient demonstrates good rehab potential due to higher previous functional level. Reason for Services/Other Comments:  · Patient continues to require skilled intervention due to recent rotator cuff surgery impairing the function of the L arm. TREATMENT:   (In addition to Assessment/Re-Assessment sessions the following treatments were rendered)      Present Symptoms/Complaints - 6/5/2018:  Pt reports his arm has been doing well. He was at a wedding this weekend and had a little bit of soreness. He states this is his last week in the sling. Pain: Initial:   Pain Intensity 1: 1  Post Session:  A bit sore with movements       Therapeutic Exercise: (0 minutes):  Exercises per grid below to improve mobility, strength and balance. Required moderate visual, verbal and tactile cues to promote proper body alignment, promote proper body posture and promote proper body mechanics. Progressed resistance, range and repetitions as indicated. Date:  5/31/18 Date:   Date:     Activity/Exercise Parameters Parameters Parameters   Pt education Findings, anatomy/pathology, POC, HEP                                             Manual Therapy: (45 minutes): was utilized and necessary because of the patient's restricted joint motion and restricted motion of soft tissue.   - PROM L shoulder flexion, abduction, ER/IR  - Manual pec major and minor stretches  - IASTM deltoid insertion    Treatment/Session Assessment:  Pt had minimal muscle guarding today with passive motions. Tightness noted at the deltoid insertion, improved after STM. Instructed pt to perform shoulder shrugs and rolls at home. · Compliance with Program/Exercises: Will assess as treatment progresses. · Recommendations/Intent for next treatment session: Next visit will focus on advancements to more challenging activities.   · Variance from plan of care: None    Total Treatment Duration:  PT Patient Time In/Time Out  Time In: 1325  Time Out: [address], PT

## 2018-06-07 ENCOUNTER — HOSPITAL ENCOUNTER (OUTPATIENT)
Dept: PHYSICAL THERAPY | Age: 53
Discharge: HOME | End: 2018-06-07
Payer: COMMERCIAL

## 2018-06-07 PROCEDURE — 97110 THERAPEUTIC EXERCISES: CPT

## 2018-06-07 NOTE — PROGRESS NOTES
Aba Silvermanurry  : 1965  Payor: Leila Keyser / Plan: [employer] / Product Type: HMO /  [address]  at [facility]  [address], [address], [facility] [address]  [phone]   [phone]       OUTPATIENT PHYSICAL THERAPY:Daily Note 2018    ICD-10: Treatment Diagnosis: Complete rotator cuff tear or rupture of left shoulder, not specified as traumatic (M75.122), Encounter for other orthopedic aftercare (Z47.89), Pain in left shoulder (M25.512), Stiffness of left shoulder, not elsewhere classified (M25.612), Muscle wasting and atrophy, not elsewhere classified, left shoulder (M62.512)  Precautions/Allergies:   Review of patient's allergies indicates no known allergies. Fall Risk Score: 1 (? 5 = High Risk)  MD Orders: PT eval and treat MEDICAL/REFERRING DIAGNOSIS:  shoulder, left   DATE OF ONSET: surgery 18  REFERRING PHYSICIAN: Rena Nguyen MD  RETURN PHYSICIAN APPOINTMENT: 18     ASSESSMENT:  Mr. Any Vega presents approx 4 weeks s/p s/p left side RTC repair and debridement of bicep long-head, labral, articular cartilage and bursal tissues. Pt has well healing surgical incisions, mild muscle guarding with passive range of motion, muscle atrophy, tightened and shortened pectoral muscles on the L side, and decreases in tolerated shoulder range of motion as expected. Pt is currently still wearing a sling and can use his arm/hand out of sling so long as he does not lift his arm. Pt will benefit from skilled PT to regain full function of his L arm. PROBLEM LIST (Impacting functional limitations):  1. Decreased Strength  2. Decreased ADL/Functional Activities  3. Increased Pain  4. Decreased Activity Tolerance  5. Decreased Flexibility/Joint Mobility  6. Edema/Girth INTERVENTIONS PLANNED:  1. Cold  2. Continuous Passive Motion (CPM)  3. Electrical Stimulation  4. Heat  5. Home Exercise Program (HEP)  6.  Manual Therapy  7. Neuromuscular Re-education/Strengthening  8. Range of Motion (ROM)  9. Therapeutic Activites  10. Therapeutic Exercise/Strengthening     TREATMENT PLAN:  Effective Dates: 5/31/2018 TO 8/30/2018 (90 days). Frequency/Duration: 2 times a week for 10 weeks    GOALS: (Goals have been discussed and agreed upon with patient.)  Short-Term Functional Goals: Time Frame: 5 weeks  1. Pt will be I and compliant with initial HEP  2. Pt will demonstrate minimum 140 degrees AROM L shoulder flexion and abduction  3. Pt will demonstrate at least 75 degrees ER and IR of L shoulder  4. Pt will demonstrate at least 4/5 muscle strength throughout L UE  5. Pt will be able to lift light objects overhead for placing on shelves/cabinets  6. Pt will score no more than 28/50 on Quick DASH  Discharge Goals: Time Frame: 10 weeks  1. Pt will demonstrate full overhead range of motion of L shoulder for ability to lift and carry objects overhead in function  2. Pt will demonstrate WNL B UE strength for ability to lift and carry heavy objects and bags  3. Pt will score no more than 15/50 on Quick DASH indicating excellent function of L UE  4. Pt will be able to lift and carry his child with no restrictions or modifications  5. Pt will participate in gym workouts fully and comply with recommendations for specific HEP  6. Pt will participate in house and yardwork with max 10% restrictions or modifications  Rehabilitation Potential For Stated Goals: Excellent              The information in this section was collected on 5/31/18 (except where otherwise noted). HISTORY:   History of Present Injury/Illness (Reason for Referral): Aba Jerry presents s/p left side RTC repair and debridement of bicep long-head, labral, articular cartilage and bursal tissues. He had been having a gradual onset of pain and went to MD to seek treatment; no specific injury.   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 No other significant medical history noted. Currently still wearing a sling, and pt reports that he was instructed by MD to go slow with his rehab to allow for proper healing. Pain location: L shoulder  Pain levels - Current: 0/10, mild increases in pain with movement   Increases pain: Movement   Decreases pain: Rest    Past Medical History/Comorbidities:   Mr. Homer Sosa  has a past medical history of Adverse effect of anesthesia; Chronic pain; Hypertension; and Migraine. Mr. Homer Sosa  has a past surgical history that includes hx knee arthroscopy (Right, 09/1984); hx rotator cuff repair (Right, 2005); and hx back surgery (2005). 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 , Disp: , Rfl:     potassium 99 mg tablet, Take 99 mg by mouth daily. , Disp: , Rfl:     levocarnitine HCl (ACETYL-L-CARNITINE), Take 1 Tab by mouth daily. , Disp: , Rfl:     ascorbic acid, vitamin C, (VITAMIN C) 500 mg tablet, Take 500 mg by mouth daily. , Disp: , Rfl:     hydroCHLOROthiazide (HYDRODIURIL) 25 mg tablet, TAKE 1 TABLET BY MOUTH EVERY DAY, Disp: 90 Tab, Rfl: 0    VIAGRA 100 mg tablet, TAKE 1 TABLET BY MOUTH EVERY DAY AS NEEDED, Disp: 30 Tab, Rfl: 0    valACYclovir (VALTREX) 1 gram tablet, TAKE 1 TABLET BY MOUTH EVERY 12 HOURS FOR 10 DAYS (Patient taking differently: TAKE 1 TABLET BY MOUTH EVERY 12 HOURS FOR 10 DAYS+---prn), Disp: 20 Tab, Rfl: 2    aspirin 81 mg tablet, Take 81 mg by mouth daily. , Disp: , Rfl:     MULTIVITAMIN PO, Take 1 Tab by mouth daily. , Disp: , Rfl:     omega-3 fatty acids-vitamin e (FISH OIL) 1,000 mg Cap, Take 1,000 mg by mouth daily. , Disp: , Rfl:     coenzyme q10 (COQ-10) 100 mg Cap, Take 1 Tab by mouth daily. , Disp: , Rfl:    Date Last Reviewed:  6/7/2018     EXAMINATION:     Observation:  Surgical incisions healing well, acromion is prominent where pt had previous shoulder separation, decreased muscle mass noted of L deltoids and infraspinatus    Postural Assessment:  Rounded forward shoulders, L more than R   Supine: tight pecs noted L>R    Palpation: No tenderness noted grossly to L shoulder    ROM:  - passive  L shoulder flexion: 105  L shoulder ER: 35  L shoulder IR:  65    Strength: Deferred to next progress note       CLINICAL DECISION MAKING:   Outcome Measure: Tool Used: Disabilities of the Arm, Shoulder and Hand (DASH) Questionnaire - Quick Version  Score:  Initial: 40/55  Most Recent: X/55 (Date: -- )   Interpretation of Score: The DASH is designed to measure the activities of daily living in person's with upper extremity dysfunction or pain. Each section is scored on a 1-5 scale, 5 representing the greatest disability. The scores of each section are added together for a total score of 55. Medical Necessity:   · Patient demonstrates good rehab potential due to higher previous functional level. Reason for Services/Other Comments:  · Patient continues to require skilled intervention due to recent rotator cuff surgery impairing the function of the L arm. TREATMENT:   (In addition to Assessment/Re-Assessment sessions the following treatments were rendered)      Present Symptoms/Complaints - 6/7/2018:  Pt reports his arm has been doing well. He was at a wedding this weekend and had a little bit of soreness. He states this is his last week in the sling. Pain: Initial:   Pain Intensity 1: 0  Post Session:  A bit sore with movements       Therapeutic Exercise: (0 minutes):  Exercises per grid below to improve mobility, strength and balance. Required moderate visual, verbal and tactile cues to promote proper body alignment, promote proper body posture and promote proper body mechanics. Progressed resistance, range and repetitions as indicated. Date:  5/31/18 Date:   Date:     Activity/Exercise Parameters Parameters Parameters   Pt education Findings, anatomy/pathology, POC, HEP                                             Manual Therapy: (45 minutes): was utilized and necessary because of the patient's restricted joint motion and restricted motion of soft tissue.   - PROM L shoulder flexion, abduction, ER/IR, extension  - Manual pec major and minor stretches    Treatment/Session Assessment:  Pt cont to do well with passive range and only minimal muscle guarding at end range. Instructed to cont scapular exercises over the weekend and will progress to [facility] and isometrics next week. · Compliance with Program/Exercises: Will assess as treatment progresses. · Recommendations/Intent for next treatment session: Next visit will focus on advancements to more challenging activities.   · Variance from plan of care: None    Total Treatment Duration:  PT Patient Time In/Time Out  Time In: 1600  Time Out: 1640    Sindhu Armijo PT

## 2018-06-12 ENCOUNTER — HOSPITAL ENCOUNTER (OUTPATIENT)
Dept: PHYSICAL THERAPY | Age: 53
Discharge: HOME | End: 2018-06-12
Payer: COMMERCIAL

## 2018-06-12 PROCEDURE — 97110 THERAPEUTIC EXERCISES: CPT

## 2018-06-12 PROCEDURE — 97140 MANUAL THERAPY 1/> REGIONS: CPT

## 2018-06-12 NOTE — PROGRESS NOTES
Lubnamalik Jones Jaci  : 1965  Payor: Jennifer Jaimes / Plan: [employer] / Product Type: HMO /  [address]  at [facility]  [address], [address], [facility] [address]  [phone]   [phone]       OUTPATIENT PHYSICAL THERAPY:Daily Note 2018    ICD-10: Treatment Diagnosis: Complete rotator cuff tear or rupture of left shoulder, not specified as traumatic (M75.122), Encounter for other orthopedic aftercare (Z47.89), Pain in left shoulder (M25.512), Stiffness of left shoulder, not elsewhere classified (M25.612), Muscle wasting and atrophy, not elsewhere classified, left shoulder (M62.512)  Precautions/Allergies:   Review of patient's allergies indicates no known allergies. Fall Risk Score: 1 (? 5 = High Risk)  MD Orders: PT eval and treat MEDICAL/REFERRING DIAGNOSIS:  shoulder, left   DATE OF ONSET: surgery 18  REFERRING PHYSICIAN: Arsalan Rooney MD  RETURN PHYSICIAN APPOINTMENT: 18     ASSESSMENT:  Mr. Petrona Blanco presents approx 4 weeks s/p s/p left side RTC repair and debridement of bicep long-head, labral, articular cartilage and bursal tissues. Pt has well healing surgical incisions, mild muscle guarding with passive range of motion, muscle atrophy, tightened and shortened pectoral muscles on the L side, and decreases in tolerated shoulder range of motion as expected. Pt is currently still wearing a sling and can use his arm/hand out of sling so long as he does not lift his arm. Pt will benefit from skilled PT to regain full function of his L arm. PROBLEM LIST (Impacting functional limitations):  1. Decreased Strength  2. Decreased ADL/Functional Activities  3. Increased Pain  4. Decreased Activity Tolerance  5. Decreased Flexibility/Joint Mobility  6. Edema/Girth INTERVENTIONS PLANNED:  1. Cold  2. Continuous Passive Motion (CPM)  3. Electrical Stimulation  4. Heat  5. Home Exercise Program (HEP)  6.  Manual Therapy  7. Neuromuscular Re-education/Strengthening  8. Range of Motion (ROM)  9. Therapeutic Activites  10. Therapeutic Exercise/Strengthening     TREATMENT PLAN:  Effective Dates: 5/31/2018 TO 8/30/2018 (90 days). Frequency/Duration: 2 times a week for 10 weeks    GOALS: (Goals have been discussed and agreed upon with patient.)  Short-Term Functional Goals: Time Frame: 5 weeks  1. Pt will be I and compliant with initial HEP  2. Pt will demonstrate minimum 140 degrees AROM L shoulder flexion and abduction  3. Pt will demonstrate at least 75 degrees ER and IR of L shoulder  4. Pt will demonstrate at least 4/5 muscle strength throughout L UE  5. Pt will be able to lift light objects overhead for placing on shelves/cabinets  6. Pt will score no more than 28/50 on Quick DASH  Discharge Goals: Time Frame: 10 weeks  1. Pt will demonstrate full overhead range of motion of L shoulder for ability to lift and carry objects overhead in function  2. Pt will demonstrate WNL B UE strength for ability to lift and carry heavy objects and bags  3. Pt will score no more than 15/50 on Quick DASH indicating excellent function of L UE  4. Pt will be able to lift and carry his child with no restrictions or modifications  5. Pt will participate in gym workouts fully and comply with recommendations for specific HEP  6. Pt will participate in house and yardwork with max 10% restrictions or modifications  Rehabilitation Potential For Stated Goals: Excellent              The information in this section was collected on 5/31/18 (except where otherwise noted). HISTORY:   History of Present Injury/Illness (Reason for Referral): Aba Dangelo presents s/p left side RTC repair and debridement of bicep long-head, labral, articular cartilage and bursal tissues. He had been having a gradual onset of pain and went to [facility] to seek treatment; no specific injury.   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 No other significant medical history noted. Currently still wearing a sling, and pt reports that he was instructed by MD to go slow with his rehab to allow for proper healing. Pain location: L shoulder  Pain levels - Current: 0/10, mild increases in pain with movement   Increases pain: Movement   Decreases pain: Rest    Past Medical History/Comorbidities:   Mr. Any Vega  has a past medical history of Adverse effect of anesthesia; Chronic pain; Hypertension; and Migraine. Mr. Any Vega  has a past surgical history that includes hx knee arthroscopy (Right, 09/1984); hx rotator cuff repair (Right, 2005); and hx back surgery (2005). 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 , Disp: , Rfl:     potassium 99 mg tablet, Take 99 mg by mouth daily. , Disp: , Rfl:     levocarnitine HCl (ACETYL-L-CARNITINE), Take 1 Tab by mouth daily. , Disp: , Rfl:     ascorbic acid, vitamin C, (VITAMIN C) 500 mg tablet, Take 500 mg by mouth daily. , Disp: , Rfl:     hydroCHLOROthiazide (HYDRODIURIL) 25 mg tablet, TAKE 1 TABLET BY MOUTH EVERY DAY, Disp: 90 Tab, Rfl: 0    VIAGRA 100 mg tablet, TAKE 1 TABLET BY MOUTH EVERY DAY AS NEEDED, Disp: 30 Tab, Rfl: 0    valACYclovir (VALTREX) 1 gram tablet, TAKE 1 TABLET BY MOUTH EVERY 12 HOURS FOR 10 DAYS (Patient taking differently: TAKE 1 TABLET BY MOUTH EVERY 12 HOURS FOR 10 DAYS+---prn), Disp: 20 Tab, Rfl: 2    aspirin 81 mg tablet, Take 81 mg by mouth daily. , Disp: , Rfl:     MULTIVITAMIN PO, Take 1 Tab by mouth daily. , Disp: , Rfl:     omega-3 fatty acids-vitamin e (FISH OIL) 1,000 mg Cap, Take 1,000 mg by mouth daily. , Disp: , Rfl:     coenzyme q10 (COQ-10) 100 mg Cap, Take 1 Tab by mouth daily. , Disp: , Rfl:    Date Last Reviewed:  6/12/2018     EXAMINATION:     Observation:  Surgical incisions healing well, acromion is prominent where pt had previous shoulder separation, decreased muscle mass noted of L deltoids and infraspinatus    Postural Assessment:  Rounded forward shoulders, L more than R   Supine: tight pecs noted L>R    Palpation: No tenderness noted grossly to L shoulder    ROM:  - passive  L shoulder flexion: 105  L shoulder ER: 35  L shoulder IR:  65    Strength: Deferred to next progress note       CLINICAL DECISION MAKING:   Outcome Measure: Tool Used: Disabilities of the Arm, Shoulder and Hand (DASH) Questionnaire - Quick Version  Score:  Initial: 40/55  Most Recent: X/55 (Date: -- )   Interpretation of Score: The DASH is designed to measure the activities of daily living in person's with upper extremity dysfunction or pain. Each section is scored on a 1-5 scale, 5 representing the greatest disability. The scores of each section are added together for a total score of 55. Medical Necessity:   · Patient demonstrates good rehab potential due to higher previous functional level. Reason for Services/Other Comments:  · Patient continues to require skilled intervention due to recent rotator cuff surgery impairing the function of the L arm.               TREATMENT:   (In addition to Assessment/Re-Assessment sessions the following treatments were rendered)      Present Symptoms/Complaints - 6/12/2018:  Pt reports his arm has been doing very well, just a little bit of soreness over the weekend. Pain: Initial:   Pain Intensity 1: 0  Post Session:  A bit sore with movements       Therapeutic Exercise: (30 minutes):  Exercises per grid below to improve mobility, strength and balance. Required moderate visual, verbal and tactile cues to promote proper body alignment, promote proper body posture and promote proper body mechanics. Progressed resistance, range and repetitions as indicated. Date:  5/31/18 Date:  6/12/18 Date:     Activity/Exercise Parameters Parameters Parameters   Pt education Findings, anatomy/pathology, POC, HEP     Shoulder flexion AAROM  Supine w/ other hand x20    Pulley  Abduction 10s holds x15    Shld isometric - abd  Wall 5s x10; manual resistance x30    Shld isometric ER  Wall 5s x10; manual resistance x30    Shld isometric IR  Manual resistance x30    shld extension  Standing AROM x30        Manual Therapy: (15 minutes): was utilized and necessary because of the patient's restricted joint motion and restricted motion of soft tissue.   - PROM L shoulder flexion, abduction, ER/IR, extension  - Manual pec major and minor stretches    Ice pack to L shoulder 10 min post-tx    Treatment/Session Assessment:  Pt had no pain with any exercises; felt a good stretch with pulley. Educated pt on progress and on additions to HEP. · Compliance with Program/Exercises: Will assess as treatment progresses. · Recommendations/Intent for next treatment session: Next visit will focus on advancements to more challenging activities. · Variance from plan of care: None    Access Code: [email or web address]   URL: [email or web address]   Exercises   Supine Shoulder Flexion AAROM with Hands Clasped - 20 reps - 1x daily   Isometric Shoulder External Rotation at Wall - 10-15 reps - 10s hold - 2x daily   Isometric Shoulder Flexion at Wall - 10s hold - 10-15 reps - 1 sets - 2x daily   Standing Isometric Shoulder Abduction with Doorway - Arm Bent - 20 reps - 5s hold - 2x daily   Single Arm Shoulder Extension with Dumbbell - 10 reps - 3 sets     Total Treatment Duration:  PT Patient Time In/Time Out  Time In: 1846  Time Out: 1000 [facility],

## 2018-06-14 ENCOUNTER — HOSPITAL ENCOUNTER (OUTPATIENT)
Dept: PHYSICAL THERAPY | Age: 53
Discharge: HOME | End: 2018-06-14
Payer: COMMERCIAL

## 2018-06-14 PROCEDURE — 97140 MANUAL THERAPY 1/> REGIONS: CPT

## 2018-06-14 PROCEDURE — 97110 THERAPEUTIC EXERCISES: CPT

## 2018-06-14 NOTE — PROGRESS NOTES
Aba Sevilla Gains  : 1965  Payor: Zekeedith Vallejo / Plan: [employer] / Product Type: HMO /  [address]  at [address]  [address], [address], [place] [address]  [phone]   [phone]       OUTPATIENT PHYSICAL THERAPY:Daily Note 2018    ICD-10: Treatment Diagnosis: Complete rotator cuff tear or rupture of left shoulder, not specified as traumatic (M75.122), Encounter for other orthopedic aftercare (Z47.89), Pain in left shoulder (M25.512), Stiffness of left shoulder, not elsewhere classified (M25.612), Muscle wasting and atrophy, not elsewhere classified, left shoulder (M62.512)  Precautions/Allergies:   Review of patient's allergies indicates no known allergies. Fall Risk Score: 1 (? 5 = High Risk)  MD Orders: PT eval and treat MEDICAL/REFERRING DIAGNOSIS:  shoulder, left   DATE OF ONSET: surgery 18  REFERRING PHYSICIAN: Blade Taylor MD  RETURN PHYSICIAN APPOINTMENT: 18     ASSESSMENT:  Mr. Akash Hensley presents approx 4 weeks s/p s/p left side RTC repair and debridement of bicep long-head, labral, articular cartilage and bursal tissues. Pt has well healing surgical incisions, mild muscle guarding with passive range of motion, muscle atrophy, tightened and shortened pectoral muscles on the L side, and decreases in tolerated shoulder range of motion as expected. Pt is currently still wearing a sling and can use his arm/hand out of sling so long as he does not lift his arm. Pt will benefit from skilled PT to regain full function of his L arm. PROBLEM LIST (Impacting functional limitations):  1. Decreased Strength  2. Decreased ADL/Functional Activities  3. Increased Pain  4. Decreased Activity Tolerance  5. Decreased Flexibility/Joint Mobility  6. Edema/Girth INTERVENTIONS PLANNED:  1. Cold  2. Continuous Passive Motion (CPM)  3. Electrical Stimulation  4. Heat  5. Home Exercise Program (HEP)  6.  Manual Therapy  7. Neuromuscular Re-education/Strengthening  8. Range of Motion (ROM)  9. Therapeutic Activites  10. Therapeutic Exercise/Strengthening     TREATMENT PLAN:  Effective Dates: 5/31/2018 TO 8/30/2018 (90 days). Frequency/Duration: 2 times a week for 10 weeks    GOALS: (Goals have been discussed and agreed upon with patient.)  Short-Term Functional Goals: Time Frame: 5 weeks  1. Pt will be I and compliant with initial HEP  2. Pt will demonstrate minimum 140 degrees AROM L shoulder flexion and abduction  3. Pt will demonstrate at least 75 degrees ER and IR of L shoulder  4. Pt will demonstrate at least 4/5 muscle strength throughout L UE  5. Pt will be able to lift light objects overhead for placing on shelves/cabinets  6. Pt will score no more than 28/50 on Quick DASH  Discharge Goals: Time Frame: 10 weeks  1. Pt will demonstrate full overhead range of motion of L shoulder for ability to lift and carry objects overhead in function  2. Pt will demonstrate WNL B UE strength for ability to lift and carry heavy objects and bags  3. Pt will score no more than 15/50 on Quick DASH indicating excellent function of L UE  4. Pt will be able to lift and carry his child with no restrictions or modifications  5. Pt will participate in gym workouts fully and comply with recommendations for specific HEP  6. Pt will participate in house and yardwork with max 10% restrictions or modifications  Rehabilitation Potential For Stated Goals: Excellent              The information in this section was collected on 5/31/18 (except where otherwise noted). HISTORY:   History of Present Injury/Illness (Reason for Referral): Aba Neely presents s/p left side RTC repair and debridement of bicep long-head, labral, articular cartilage and bursal tissues. He had been having a gradual onset of pain and went to MD to seek treatment; no specific injury.   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 No other significant medical history noted. Currently still wearing a sling, and pt reports that he was instructed by MD to go slow with his rehab to allow for proper healing. Pain location: L shoulder  Pain levels - Current: 0/10, mild increases in pain with movement   Increases pain: Movement   Decreases pain: Rest    Past Medical History/Comorbidities:   Mr. Neville Ham  has a past medical history of Adverse effect of anesthesia; Chronic pain; Hypertension; and Migraine. Mr. Neville Ham  has a past surgical history that includes hx knee arthroscopy (Right, 09/1984); hx rotator cuff repair (Right, 2005); and hx back surgery (2005). 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 , Disp: , Rfl:     potassium 99 mg tablet, Take 99 mg by mouth daily. , Disp: , Rfl:     levocarnitine HCl (ACETYL-L-CARNITINE), Take 1 Tab by mouth daily. , Disp: , Rfl:     ascorbic acid, vitamin C, (VITAMIN C) 500 mg tablet, Take 500 mg by mouth daily. , Disp: , Rfl:     hydroCHLOROthiazide (HYDRODIURIL) 25 mg tablet, TAKE 1 TABLET BY MOUTH EVERY DAY, Disp: 90 Tab, Rfl: 0    VIAGRA 100 mg tablet, TAKE 1 TABLET BY MOUTH EVERY DAY AS NEEDED, Disp: 30 Tab, Rfl: 0    valACYclovir (VALTREX) 1 gram tablet, TAKE 1 TABLET BY MOUTH EVERY 12 HOURS FOR 10 DAYS (Patient taking differently: TAKE 1 TABLET BY MOUTH EVERY 12 HOURS FOR 10 DAYS+---prn), Disp: 20 Tab, Rfl: 2    aspirin 81 mg tablet, Take 81 mg by mouth daily. , Disp: , Rfl:     MULTIVITAMIN PO, Take 1 Tab by mouth daily. , Disp: , Rfl:     omega-3 fatty acids-vitamin e (FISH OIL) 1,000 mg Cap, Take 1,000 mg by mouth daily. , Disp: , Rfl:     coenzyme q10 (COQ-10) 100 mg Cap, Take 1 Tab by mouth daily. , Disp: , Rfl:    Date Last Reviewed:  6/14/2018     EXAMINATION:     Observation:  Surgical incisions healing well, acromion is prominent where pt had previous shoulder separation, decreased muscle mass noted of L deltoids and infraspinatus    Postural Assessment:  Rounded forward shoulders, L more than R   Supine: tight pecs noted L>R    Palpation: No tenderness noted grossly to L shoulder    ROM:  - passive  L shoulder flexion: 105  L shoulder ER: 35  L shoulder IR:  65    Strength: Deferred to next progress note       CLINICAL DECISION MAKING:   Outcome Measure: Tool Used: Disabilities of the Arm, Shoulder and Hand (DASH) Questionnaire - Quick Version  Score:  Initial: 40/55  Most Recent: X/55 (Date: -- )   Interpretation of Score: The DASH is designed to measure the activities of daily living in person's with upper extremity dysfunction or pain. Each section is scored on a 1-5 scale, 5 representing the greatest disability. The scores of each section are added together for a total score of 55. Medical Necessity:   · Patient demonstrates good rehab potential due to higher previous functional level. Reason for Services/Other Comments:  · Patient continues to require skilled intervention due to recent rotator cuff surgery impairing the function of the L arm.               TREATMENT:   (In addition to Assessment/Re-Assessment sessions the following treatments were rendered)      Present Symptoms/Complaints - 6/14/2018:  Pt reports his arm has been doing very well, just a little bit of soreness since last visit. Pain: Initial:   Pain Intensity 1: 0  Post Session:  A bit sore with movements       Therapeutic Exercise: (25 minutes):  Exercises per grid below to improve mobility, strength and balance. Required moderate visual, verbal and tactile cues to promote proper body alignment, promote proper body posture and promote proper body mechanics. Progressed resistance, range and repetitions as indicated. Date:  5/31/18 Date:  6/12/18 Date:  6/14/18   Activity/Exercise Parameters Parameters Parameters   Pt education Findings, anatomy/pathology, POC, HEP     UBE   L1 10 min, L UE passive   Shoulder flexion AAROM  Supine w/ other hand x20    Pulley  Abduction 10s holds x15 Abduction 10s holds x15   Shld isometric - abd  Wall 5s x10; manual resistance x30 Manual x30   Shld isometric ER  Wall 5s x10; manual resistance x30 Manual to fatigue varying speeds   Shld isometric IR  Manual resistance x30 Manual to fatigue varying speeds   shld extension  Standing AROM x30    Rows          Manual Therapy: (20 minutes): was utilized and necessary because of the patient's restricted joint motion and restricted motion of soft tissue.   - PROM L shoulder flexion, abduction, ER/IR, extension, slight adduction  - Manual pec major and minor stretches (NT)    Ice pack to L shoulder 10 min post-tx    Treatment/Session Assessment:  Pt is making continuous improvement with range of motion and is progressing at an excellent pace. · Compliance with Program/Exercises: Good  · Recommendations/Intent for next treatment session: Next visit will focus on advancements to more challenging activities. · Variance from plan of care: None    Access Code: [email or web address]   URL: [email or web address]   Exercises   Supine Shoulder Flexion AAROM with Hands Clasped - 20 reps - 1x daily   Isometric Shoulder External Rotation at Wall - 10-15 reps - 10s hold - 2x daily   Isometric Shoulder Flexion at Wall - 10s hold - 10-15 reps - 1 sets - 2x daily   Standing Isometric Shoulder Abduction with Doorway - Arm Bent - 20 reps - 5s hold - 2x daily   Single Arm Shoulder Extension with Dumbbell - 10 reps - 3 sets     Total Treatment Duration:  PT Patient Time In/Time Out  Time In: 1515  Time Out: 1610    Sindhu Armijo, PT

## 2018-06-25 ENCOUNTER — HOSPITAL ENCOUNTER (OUTPATIENT)
Dept: PHYSICAL THERAPY | Age: 53
Discharge: HOME | End: 2018-06-25
Payer: COMMERCIAL

## 2018-06-25 PROCEDURE — 97110 THERAPEUTIC EXERCISES: CPT

## 2018-06-25 PROCEDURE — 97140 MANUAL THERAPY 1/> REGIONS: CPT

## 2018-06-25 NOTE — PROGRESS NOTES
Aba Hung  : 1965  Payor: Filiberto Juarez / Plan: [employer] / Product Type: HMO /  [address]  at [facility]  [address], [address], [facility] [address]  [phone]   [phone]       OUTPATIENT PHYSICAL THERAPY:Daily Note 2018    ICD-10: Treatment Diagnosis: Complete rotator cuff tear or rupture of left shoulder, not specified as traumatic (M75.122), Encounter for other orthopedic aftercare (Z47.89), Pain in left shoulder (M25.512), Stiffness of left shoulder, not elsewhere classified (M25.612), Muscle wasting and atrophy, not elsewhere classified, left shoulder (M62.512)  Precautions/Allergies:   Review of patient's allergies indicates no known allergies. Fall Risk Score: 1 (? 5 = High Risk)  MD Orders: PT eval and treat MEDICAL/REFERRING DIAGNOSIS:  shoulder, left   DATE OF ONSET: surgery 18  REFERRING PHYSICIAN: Mony Guallpa MD  RETURN PHYSICIAN APPOINTMENT: 18     ASSESSMENT:  Mr. Mariam Flores presents approx 4 weeks s/p s/p left side RTC repair and debridement of bicep long-head, labral, articular cartilage and bursal tissues. Pt has well healing surgical incisions, mild muscle guarding with passive range of motion, muscle atrophy, tightened and shortened pectoral muscles on the L side, and decreases in tolerated shoulder range of motion as expected. Pt is currently still wearing a sling and can use his arm/hand out of sling so long as he does not lift his arm. Pt will benefit from skilled PT to regain full function of his L arm. PROBLEM LIST (Impacting functional limitations):  1. Decreased Strength  2. Decreased ADL/Functional Activities  3. Increased Pain  4. Decreased Activity Tolerance  5. Decreased Flexibility/Joint Mobility  6. Edema/Girth INTERVENTIONS PLANNED:  1. Cold  2. Continuous Passive Motion (CPM)  3. Electrical Stimulation  4. Heat  5. Home Exercise Program (HEP)  6.  Manual Therapy  7. Neuromuscular Re-education/Strengthening  8. Range of Motion (ROM)  9. Therapeutic Activites  10. Therapeutic Exercise/Strengthening     TREATMENT PLAN:  Effective Dates: 5/31/2018 TO 8/30/2018 (90 days). Frequency/Duration: 2 times a week for 10 weeks    GOALS: (Goals have been discussed and agreed upon with patient.)  Short-Term Functional Goals: Time Frame: 5 weeks  1. Pt will be I and compliant with initial HEP  2. Pt will demonstrate minimum 140 degrees AROM L shoulder flexion and abduction  3. Pt will demonstrate at least 75 degrees ER and IR of L shoulder  4. Pt will demonstrate at least 4/5 muscle strength throughout L UE  5. Pt will be able to lift light objects overhead for placing on shelves/cabinets  6. Pt will score no more than 28/50 on Quick DASH  Discharge Goals: Time Frame: 10 weeks  1. Pt will demonstrate full overhead range of motion of L shoulder for ability to lift and carry objects overhead in function  2. Pt will demonstrate WNL B UE strength for ability to lift and carry heavy objects and bags  3. Pt will score no more than 15/50 on Quick DASH indicating excellent function of L UE  4. Pt will be able to lift and carry his child with no restrictions or modifications  5. Pt will participate in gym workouts fully and comply with recommendations for specific HEP  6. Pt will participate in house and yardwork with max 10% restrictions or modifications  Rehabilitation Potential For Stated Goals: Excellent              The information in this section was collected on 5/31/18 (except where otherwise noted). HISTORY:   History of Present Injury/Illness (Reason for Referral): Aba Wharton Leader presents s/p left side RTC repair and debridement of bicep long-head, labral, articular cartilage and bursal tissues. He had been having a gradual onset of pain and went to MD to seek treatment; no specific injury.   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 No other significant medical history noted. Currently still wearing a sling, and pt reports that he was instructed by MD to go slow with his rehab to allow for proper healing. Pain location: L shoulder  Pain levels - Current: 0/10, mild increases in pain with movement   Increases pain: Movement   Decreases pain: Rest    Past Medical History/Comorbidities:   Mr. Vania Solis  has a past medical history of Adverse effect of anesthesia; Chronic pain; Hypertension; and Migraine. Mr. Vania Solis  has a past surgical history that includes hx knee arthroscopy (Right, 09/1984); hx rotator cuff repair (Right, 2005); and hx back surgery (2005). 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 , Disp: , Rfl:     potassium 99 mg tablet, Take 99 mg by mouth daily. , Disp: , Rfl:     levocarnitine HCl (ACETYL-L-CARNITINE), Take 1 Tab by mouth daily. , Disp: , Rfl:     ascorbic acid, vitamin C, (VITAMIN C) 500 mg tablet, Take 500 mg by mouth daily. , Disp: , Rfl:     hydroCHLOROthiazide (HYDRODIURIL) 25 mg tablet, TAKE 1 TABLET BY MOUTH EVERY DAY, Disp: 90 Tab, Rfl: 0    VIAGRA 100 mg tablet, TAKE 1 TABLET BY MOUTH EVERY DAY AS NEEDED, Disp: 30 Tab, Rfl: 0    valACYclovir (VALTREX) 1 gram tablet, TAKE 1 TABLET BY MOUTH EVERY 12 HOURS FOR 10 DAYS (Patient taking differently: TAKE 1 TABLET BY MOUTH EVERY 12 HOURS FOR 10 DAYS+---prn), Disp: 20 Tab, Rfl: 2    aspirin 81 mg tablet, Take 81 mg by mouth daily. , Disp: , Rfl:     MULTIVITAMIN PO, Take 1 Tab by mouth daily. , Disp: , Rfl:     omega-3 fatty acids-vitamin e (FISH OIL) 1,000 mg Cap, Take 1,000 mg by mouth daily. , Disp: , Rfl:     coenzyme q10 (COQ-10) 100 mg Cap, Take 1 Tab by mouth daily. , Disp: , Rfl:    Date Last Reviewed:  6/25/2018     EXAMINATION:     Observation:  Surgical incisions healing well, acromion is prominent where pt had previous shoulder separation, decreased muscle mass noted of L deltoids and infraspinatus    Postural Assessment:  Rounded forward shoulders, L more than R   Supine: tight pecs noted L>R    Palpation: No tenderness noted grossly to L shoulder    ROM:  - passive  L shoulder flexion: 105  L shoulder ER: 35  L shoulder IR:  65    Strength: Deferred to next progress note       CLINICAL DECISION MAKING:   Outcome Measure: Tool Used: Disabilities of the Arm, Shoulder and Hand (DASH) Questionnaire - Quick Version  Score:  Initial: 40/55  Most Recent: X/55 (Date: -- )   Interpretation of Score: The DASH is designed to measure the activities of daily living in person's with upper extremity dysfunction or pain. Each section is scored on a 1-5 scale, 5 representing the greatest disability. The scores of each section are added together for a total score of 55. Medical Necessity:   · Patient demonstrates good rehab potential due to higher previous functional level. Reason for Services/Other Comments:  · Patient continues to require skilled intervention due to recent rotator cuff surgery impairing the function of the L arm.               TREATMENT:   (In addition to Assessment/Re-Assessment sessions the following treatments were rendered)      Present Symptoms/Complaints - 6/25/2018:  Pt reports that for about a week, when he lifts his arm up in front of him at a small range he has been having pain of about 5-6/10 with some popping. There was one instance where he felt some pain at his elbow and felt he needed to massage it to relieve the pain. Pain: Initial:   Pain Intensity 1: 0 - at rest Post Session:  3-4/10 with some AROM       Therapeutic Exercise: (30 minutes):  Exercises per grid below to improve mobility, strength and balance. Required moderate visual, verbal and tactile cues to promote proper body alignment, promote proper body posture and promote proper body mechanics. Progressed resistance, range and repetitions as indicated. Date:  5/31/18 Date:  6/12/18 Date:  6/14/18 Date:  6/25/18   Activity/Exercise Parameters Parameters Parameters    Pt education Findings, anatomy/pathology, POC, HEP   Education on anatomy and surgery   UBE   L1 10 min, L UE passive L4.0 5/5   Shoulder flexion AAROM  Supine w/ other hand x20     Pulley  Abduction 10s holds x15 Abduction 10s holds x15    Shld isometric - abd  Wall 5s x10; manual resistance x30 Manual x30    Shld isometric ER  Wall 5s x10; manual resistance x30 Manual to fatigue varying speeds    Shld isometric IR  Manual resistance x30 Manual to fatigue varying speeds    shld extension  Standing AROM x30  Elbows flexed 20x   Rows       Shld abduction    S/lying, small ROM 2 x 10   Shld ER    - sitting 20x  - sidelying 20x   Shld IR    - RTB gentle 20x                     Manual Therapy: (15 minutes): was utilized and necessary because of the patient's restricted joint motion and restricted motion of soft tissue.   - PROM L shoulder flexion, abduction  - Manual pec major and minor stretches (NT)    Treatment/Session Assessment:  Pt has pain and crepitus when attempting shoulder flexion AROM in supine. Also has crepitus with sidelying ER, which goes away when I apply compression directly to the anterior musculature/bicep tendon. Appears to be bicep tendon issues.   Pt is seeing surgeon for follow-up this Thursday 6/28. · Compliance with Program/Exercises: Good  · Recommendations/Intent for next treatment session: Next visit will focus on advancements to more challenging activities. · Variance from plan of care: None    Access Code: [email or web address]   URL: [email or web address]   Exercises   Supine Shoulder Flexion AAROM with Hands Clasped - 20 reps - 1x daily   Isometric Shoulder External Rotation at Wall - 10-15 reps - 10s hold - 2x daily   Isometric Shoulder Flexion at Wall - 10s hold - 10-15 reps - 1 sets - 2x daily   Standing Isometric Shoulder Abduction with Doorway - Arm Bent - 20 reps - 5s hold - 2x daily   Single Arm Shoulder Extension with Dumbbell - 10 reps - 3 sets     Total Treatment Duration:  PT Patient Time In/Time Out  Time In: 1615  Time Out: 1700    Agustin Freedman, PT

## 2018-06-28 ENCOUNTER — HOSPITAL ENCOUNTER (OUTPATIENT)
Dept: PHYSICAL THERAPY | Age: 53
Discharge: HOME | End: 2018-06-28
Payer: COMMERCIAL

## 2018-06-28 PROCEDURE — 97140 MANUAL THERAPY 1/> REGIONS: CPT

## 2018-06-28 PROCEDURE — 97016 VASOPNEUMATIC DEVICE THERAPY: CPT

## 2018-06-28 PROCEDURE — 97110 THERAPEUTIC EXERCISES: CPT

## 2018-06-28 NOTE — PROGRESS NOTES
[facility]  [facility] 1965  Payor: Susanne Score / Plan: [address] HMO / Product Type: HMO /  [address]  at [facility]  [address], [address], [facility] [address]  [phone]   [phone]       OUTPATIENT PHYSICAL THERAPY:Daily Note 2018    ICD-10: Treatment Diagnosis: Complete rotator cuff tear or rupture of left shoulder, not specified as traumatic (M75.122), Encounter for other orthopedic aftercare (Z47.89), Pain in left shoulder (M25.512), Stiffness of left shoulder, not elsewhere classified (M25.612), Muscle wasting and atrophy, not elsewhere classified, left shoulder (M62.512)  Precautions/Allergies:   Review of patient's allergies indicates no known allergies. Fall Risk Score: 1 (? 5 = High Risk)  MD Orders: PT eval and treat MEDICAL/REFERRING DIAGNOSIS:  shoulder, left   DATE OF ONSET: surgery 18  REFERRING PHYSICIAN: Charis Louis MD  RETURN PHYSICIAN APPOINTMENT: 18     ASSESSMENT:  Mr. Aurelia Sorensen presents approx 4 weeks s/p s/p left side RTC repair and debridement of bicep long-head, labral, articular cartilage and bursal tissues. Pt has well healing surgical incisions, mild muscle guarding with passive range of motion, muscle atrophy, tightened and shortened pectoral muscles on the L side, and decreases in tolerated shoulder range of motion as expected. Pt is currently still wearing a sling and can use his arm/hand out of sling so long as he does not lift his arm. Pt will benefit from skilled PT to regain full function of his L arm. PROBLEM LIST (Impacting functional limitations):  1. Decreased Strength  2. Decreased ADL/Functional Activities  3. Increased Pain  4. Decreased Activity Tolerance  5. Decreased Flexibility/Joint Mobility  6. Edema/Girth INTERVENTIONS PLANNED:  1. Cold  2. Continuous Passive Motion (CPM)  3. Electrical Stimulation  4. Heat  5. Home Exercise Program (HEP)  6.  Manual Therapy  7. Neuromuscular Re-education/Strengthening  8. Range of Motion (ROM)  9. Therapeutic Activites  10. Therapeutic Exercise/Strengthening     TREATMENT PLAN:  Effective Dates: 5/31/2018 TO 8/30/2018 (90 days). Frequency/Duration: 2 times a week for 10 weeks    GOALS: (Goals have been discussed and agreed upon with patient.)  Short-Term Functional Goals: Time Frame: 5 weeks  1. Pt will be I and compliant with initial HEP  2. Pt will demonstrate minimum 140 degrees AROM L shoulder flexion and abduction  3. Pt will demonstrate at least 75 degrees ER and IR of L shoulder  4. Pt will demonstrate at least 4/5 muscle strength throughout L UE  5. Pt will be able to lift light objects overhead for placing on shelves/cabinets  6. Pt will score no more than 28/50 on Quick DASH  Discharge Goals: Time Frame: 10 weeks  1. Pt will demonstrate full overhead range of motion of L shoulder for ability to lift and carry objects overhead in function  2. Pt will demonstrate WNL B UE strength for ability to lift and carry heavy objects and bags  3. Pt will score no more than 15/50 on Quick DASH indicating excellent function of L UE  4. Pt will be able to lift and carry his child with no restrictions or modifications  5. Pt will participate in gym workouts fully and comply with recommendations for specific HEP  6. Pt will participate in house and yardwork with max 10% restrictions or modifications  Rehabilitation Potential For Stated Goals: Excellent              The information in this section was collected on 5/31/18 (except where otherwise noted). HISTORY:   History of Present Injury/Illness (Reason for Referral): Aba Perkins presents s/p left side RTC repair and debridement of bicep long-head, labral, articular cartilage and bursal tissues. He had been having a gradual onset of pain and went to MD to seek treatment; no specific injury.   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 No other significant medical history noted. Currently still wearing a sling, and pt reports that he was instructed by MD to go slow with his rehab to allow for proper healing. Pain location: L shoulder  Pain levels - Current: 0/10, mild increases in pain with movement   Increases pain: Movement   Decreases pain: Rest    Past Medical History/Comorbidities:   Mr. Johnie Julien  has a past medical history of Adverse effect of anesthesia; Chronic pain; Hypertension; and Migraine. Mr. Johnie Julien  has a past surgical history that includes hx knee arthroscopy (Right, 09/1984); hx rotator cuff repair (Right, 2005); and hx back surgery (2005). 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 , Disp: , Rfl:     potassium 99 mg tablet, Take 99 mg by mouth daily. , Disp: , Rfl:     levocarnitine HCl (ACETYL-L-CARNITINE), Take 1 Tab by mouth daily. , Disp: , Rfl:     ascorbic acid, vitamin C, (VITAMIN C) 500 mg tablet, Take 500 mg by mouth daily. , Disp: , Rfl:     hydroCHLOROthiazide (HYDRODIURIL) 25 mg tablet, TAKE 1 TABLET BY MOUTH EVERY DAY, Disp: 90 Tab, Rfl: 0    VIAGRA 100 mg tablet, TAKE 1 TABLET BY MOUTH EVERY DAY AS NEEDED, Disp: 30 Tab, Rfl: 0    valACYclovir (VALTREX) 1 gram tablet, TAKE 1 TABLET BY MOUTH EVERY 12 HOURS FOR 10 DAYS (Patient taking differently: TAKE 1 TABLET BY MOUTH EVERY 12 HOURS FOR 10 DAYS+---prn), Disp: 20 Tab, Rfl: 2    aspirin 81 mg tablet, Take 81 mg by mouth daily. , Disp: , Rfl:     MULTIVITAMIN PO, Take 1 Tab by mouth daily. , Disp: , Rfl:     omega-3 fatty acids-vitamin e (FISH OIL) 1,000 mg Cap, Take 1,000 mg by mouth daily. , Disp: , Rfl:     coenzyme q10 (COQ-10) 100 mg Cap, Take 1 Tab by mouth daily. , Disp: , Rfl:    Date Last Reviewed:  6/28/2018     EXAMINATION:     Observation:  Surgical incisions healing well, acromion is prominent where pt had previous shoulder separation, decreased muscle mass noted of L deltoids and infraspinatus    Postural Assessment:  Rounded forward shoulders, L more than R   Supine: tight pecs noted L>R    Palpation: No tenderness noted grossly to L shoulder    ROM:  - passive  L shoulder flexion: 105  L shoulder ER: 35  L shoulder IR:  65    Strength: Deferred to next progress note       CLINICAL DECISION MAKING:   Outcome Measure: Tool Used: Disabilities of the Arm, Shoulder and Hand (DASH) Questionnaire - Quick Version  Score:  Initial: 40/55  Most Recent: X/55 (Date: -- )   Interpretation of Score: The DASH is designed to measure the activities of daily living in person's with upper extremity dysfunction or pain. Each section is scored on a 1-5 scale, 5 representing the greatest disability. The scores of each section are added together for a total score of 55. Medical Necessity:   · Patient demonstrates good rehab potential due to higher previous functional level. Reason for Services/Other Comments:  · Patient continues to require skilled intervention due to recent rotator cuff surgery impairing the function of the L arm.               TREATMENT:   (In addition to Assessment/Re-Assessment sessions the following treatments were rendered)      Present Symptoms/Complaints - 6/28/2018:  Pt reports he saw ortho today, and the MD is not worried about his pain or the popping in the shoulder, just instructed to keep going slow with therapy. Pain: Initial:   Pain Intensity 1: 1 - at rest Post Session:  Unchanged       Therapeutic Exercise: (30 minutes):  Exercises per grid below to improve mobility, strength and balance. Required moderate visual, verbal and tactile cues to promote proper body alignment, promote proper body posture and promote proper body mechanics. Progressed resistance, range and repetitions as indicated. Date:  5/31/18 Date:  6/12/18 Date:  6/14/18 Date:  6/25/18 Date:  6/28/18   Activity/Exercise Parameters Parameters Parameters     Pt education Findings, anatomy/pathology, POC, HEP   Education on anatomy and surgery    UBE   L1 10 min, L UE passive L4.0 5/5 L4.0 5/5   Shoulder flexion AAROM  Supine w/ other hand x20      Pulley  Abduction 10s holds x15 Abduction 10s holds x15     Shld isometric - abd  Wall 5s x10; manual resistance x30 Manual x30     Shld isometric ER  Wall 5s x10; manual resistance x30 Manual to fatigue varying speeds     Shld isometric IR  Manual resistance x30 Manual to fatigue varying speeds     shld extension  Standing AROM x30  Elbows flexed 20x    Rows     BTB 30x   Shld abduction    S/lying, small ROM 2 x 10 S/lying, small ROM 2 x 10   Shld ER    - sitting 20x  - sidelying 20x Sidelying 3 x 10   Shld IR    - RTB gentle 20x                        Manual Therapy: (15 minutes): was utilized and necessary because of the patient's restricted joint motion and restricted motion of soft tissue.   - PROM L shoulder flexion, abduction    Modalities (15 min):  - Vasopneumatic compression L shoulder for 15 min, 34 deg, high compression    Treatment/Session Assessment:  Tried finger ladder for stretch and pt had pain in the shoulder, so it was discontinued. Pt does very well with PROM, however he is still limited to approx 110-120 degrees of flexion and abduction.      · Compliance with Program/Exercises: Good  · Recommendations/Intent for next treatment session: Next visit will focus on advancements to more challenging activities. · Variance from plan of care: None    HEP Access Code: [email or web address]   URL: [email or web address]   Exercises   Supine Shoulder Flexion AAROM with Hands Clasped - 20 reps - 1x daily   Isometric Shoulder External Rotation at Wall - 10-15 reps - 10s hold - 2x daily   Isometric Shoulder Flexion at Wall - 10s hold - 10-15 reps - 1 sets - 2x daily   Standing Isometric Shoulder Abduction with Doorway - Arm Bent - 20 reps - 5s hold - 2x daily   Single Arm Shoulder Extension with Dumbbell - 10 reps - 3 sets   Sidelying Shoulder Abduction Palm Forward - 10 reps - 3 sets - 1x daily   Sidelying Shoulder External Rotation - 10 reps - 3 sets - 1x daily   Standing Shoulder Row with Anchored Resistance - 10 reps - 3 sets   Prone Shoulder Row - 10 reps - 3 sets     Total Treatment Duration:  PT Patient Time In/Time Out  Time In: 1545  Time Out: 1645    Denisha Karimi PT

## 2018-07-03 ENCOUNTER — HOSPITAL ENCOUNTER (OUTPATIENT)
Dept: PHYSICAL THERAPY | Age: 53
Discharge: HOME | End: 2018-07-03

## 2018-07-03 NOTE — PROGRESS NOTES
Aba Denney  : 1965  Primary: [address]  Secondary:  [address] [facility]  [address], [address], [place]  [phone]   [phone]        OUTPATIENT DAILY NOTE    NAME/AGE/GENDER: Misbah Cheney is a 46 y.o. male. DATE: 7/3/2018    Patient canceled for appointment today due to his wife having their baby. Will plan to follow up on next scheduled visit.     Sara Boo PT

## 2018-07-05 ENCOUNTER — HOSPITAL ENCOUNTER (OUTPATIENT)
Dept: PHYSICAL THERAPY | Age: 53
Discharge: HOME | End: 2018-07-05

## 2018-07-05 NOTE — PROGRESS NOTES
Aba Tapan Saji  : 1965  Primary: [address]  Secondary:  [address] [facility]  [address], [place] [address]  [phone]   [phone]        OUTPATIENT DAILY NOTE    NAME/AGE/GENDER: Kayley Hylton is a 46 y.o. male. DATE: 2018    Patient canceled for appointment today due to staying home with his wife and their new baby. Will plan to follow up on next scheduled visit.     Michael Modi, PT

## 2018-08-14 ENCOUNTER — HOSPITAL ENCOUNTER (OUTPATIENT)
Dept: PHYSICAL THERAPY | Age: 53
Discharge: HOME | End: 2018-08-14
Payer: COMMERCIAL

## 2018-08-14 PROCEDURE — 97164 PT RE-EVAL EST PLAN CARE: CPT

## 2018-08-14 PROCEDURE — 97110 THERAPEUTIC EXERCISES: CPT

## 2018-08-14 PROCEDURE — 97140 MANUAL THERAPY 1/> REGIONS: CPT

## 2018-08-14 NOTE — PROGRESS NOTES
Aba Herrera  : 1965  Payor: Micheline Prince / Plan: [employer] HMO / Product Type: HMO /  [address] [place]  at [facility]  [address], [address], [facility] [address]  [phone]   [phone]       OUTPATIENT PHYSICAL THERAPY:Daily Note and Re-evaluation 2018    ICD-10: Treatment Diagnosis: Complete rotator cuff tear or rupture of left shoulder, not specified as traumatic (M75.122), Encounter for other orthopedic aftercare (Z47.89), Pain in left shoulder (M25.512), Stiffness of left shoulder, not elsewhere classified (M25.612), Muscle wasting and atrophy, not elsewhere classified, left shoulder (M62.512)  Precautions/Allergies:   Review of patient's allergies indicates no known allergies. Fall Risk Score: 1 (? 5 = High Risk)  MD Orders: PT eval and treat MEDICAL/REFERRING DIAGNOSIS:  shoulder, left   DATE OF ONSET: surgery 18  REFERRING PHYSICIAN: Katy Jordan MD  RETURN PHYSICIAN APPOINTMENT: 18     ASSESSMENT:  Mr. Janet Noyola participated in 7 visits of skilled PT in May/ and now returns after a 6 week break due to personal reasons. Pt has good overhead range of motion, however still has some tightness and restriction in the joint capsule and latissimus dorsi. He demonstrates mostly 4-/5 strength in the L UE and 4/5 strength of the L scapular muscles. Pt will benefit from continued skilled PT to reach his goals and return to PLOF. PROBLEM LIST (Impacting functional limitations):  1. Decreased Strength  2. Decreased ADL/Functional Activities  3. Increased Pain  4. Decreased Activity Tolerance  5. Decreased Flexibility/Joint Mobility  6. Edema/Girth INTERVENTIONS PLANNED:  1. Cold  2. Continuous Passive Motion (CPM)  3. Electrical Stimulation  4. Heat  5. Home Exercise Program (HEP)  6. Manual Therapy  7. Neuromuscular Re-education/Strengthening  8. Range of Motion (ROM)  9. Therapeutic Activites  10.  Therapeutic Exercise/Strengthening     TREATMENT PLAN - UPDATED ON 8/14/18:  Effective Dates: 8/14/2018 TO 10/14/2018. Frequency/Duration: 2 times a week for 6 weeks    GOALS: (Goals have been discussed and agreed upon with patient.) - UPDATED ON 8/14/18:  Short-Term Functional Goals: Time Frame: 3 weeks  1. Pt will be I and compliant with initial HEP- met  2. Pt will demonstrate minimum 140 degrees AROM L shoulder flexion and abduction - met  3. Pt will demonstrate at least 75 degrees ER and IR of L shoulder- partially met  4. Pt will demonstrate at least 4/5 muscle strength throughout L UE- ongoing  5. Pt will be able to lift light objects overhead for placing on shelves/cabinets- met  6. Pt will score no more than 28/50 on Quick DASH  Discharge Goals: Time Frame: 6 weeks  1. Pt will demonstrate full overhead range of motion of L shoulder for ability to lift and carry objects overhead in function  2. Pt will demonstrate WNL B UE strength for ability to lift and carry heavy objects and bags  3. Pt will score no more than 15/50 on Quick DASH indicating excellent function of L UE  4. Pt will be able to lift and carry his child with no restrictions or modifications  5. Pt will participate in gym workouts fully and comply with recommendations for specific HEP  6. Pt will participate in house and yardwork with max 10% restrictions or modifications  Rehabilitation Potential For Stated Goals: Excellent              The information in this section was collected on 5/31/18 (except where otherwise noted). HISTORY:   History of Present Injury/Illness (Reason for Referral): Aba Gooden presents s/p left side RTC repair and debridement of bicep long-head, labral, articular cartilage and bursal tissues. He had been having a gradual onset of pain and went to MD to seek treatment; no specific injury.   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 No other significant medical history noted. Currently still wearing a sling, and pt reports that he was instructed by MD to go slow with his rehab to allow for proper healing. Pain location: L shoulder  Pain levels - Current: 0/10, mild increases in pain with movement   Increases pain: Movement   Decreases pain: Rest    Past Medical History/Comorbidities:   Mr. Sarah Rodriguez  has a past medical history of Adverse effect of anesthesia; Chronic pain; Hypertension; and Migraine. Mr. Sarah Rodriguez  has a past surgical history that includes hx knee arthroscopy (Right, 09/1984); hx rotator cuff repair (Right, 2005); and hx back surgery (2005). 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    Functional Limitations: Limited use of L arm for lifting and movement    Current Medications:       Current Outpatient Prescriptions:     sildenafil citrate (VIAGRA) 100 mg tablet, TAKE 1 TABLET BY MOUTH EVERY DAY AS NEEDED, Disp: 30 Tab, Rfl: 0    quinapril (ACCUPRIL) 10 mg tablet, TAKE 1 TABLET BY MOUTH EVERY DAY, Disp: 30 Tab, Rfl: 5    finasteride (PROPECIA) 1 mg tablet, TAKE 1 TABLET BY MOUTH DAILY, Disp: 90 Tab, Rfl: 0    hydroCHLOROthiazide (HYDRODIURIL) 25 mg tablet, TAKE 1 TABLET BY MOUTH EVERY DAY, Disp: 90 Tab, Rfl: 0    acetaminophen/diphenhydramine (TYLENOL PM PO), Take 1 Tab by mouth daily. , Disp: , Rfl:     potassium 99 mg tablet, Take 99 mg by mouth daily. , Disp: , Rfl:     levocarnitine HCl (ACETYL-L-CARNITINE), Take 1 Tab by mouth daily. , Disp: , Rfl:     ascorbic acid, vitamin C, (VITAMIN C) 500 mg tablet, Take 500 mg by mouth daily. , Disp: , Rfl:     hydroCHLOROthiazide (HYDRODIURIL) 25 mg tablet, TAKE 1 TABLET BY MOUTH EVERY DAY, Disp: 90 Tab, Rfl: 0    valACYclovir (VALTREX) 1 gram tablet, TAKE 1 TABLET BY MOUTH EVERY 12 HOURS FOR 10 DAYS (Patient taking differently: TAKE 1 TABLET BY MOUTH EVERY 12 HOURS FOR 10 DAYS+---prn), Disp: 20 Tab, Rfl: 2    aspirin 81 mg tablet, Take 81 mg by mouth daily. , Disp: , Rfl:     MULTIVITAMIN PO, Take 1 Tab by mouth daily. , Disp: , Rfl:     omega-3 fatty acids-vitamin e (FISH OIL) 1,000 mg Cap, Take 1,000 mg by mouth daily. , Disp: , Rfl:     coenzyme q10 (COQ-10) 100 mg Cap, Take 1 Tab by mouth daily. , Disp: , Rfl:    Date Last Reviewed:  8/14/2018     EXAMINATION:   Re-evaluation 8/14/18:    Shoulder AROM R L   Flexion 160 138   Abduction 165 150   ER 75 65   IR WNL WNL   Extension 75 65     Shoulder  R L   Flexion 5 4-   Abduction 5 4-   ER 5 4-   IR 5 4+   Extension 5 4   Mid Trap 5 4-      Tests: Inferior capsule - moderate restriction most notably in abduction range of 90 degrees and above    Initial evaluation:    Observation:  Surgical incisions healing well, acromion is prominent where pt had previous shoulder separation, decreased muscle mass noted of L deltoids and infraspinatus    Postural Assessment:  Rounded forward shoulders, L more than R   Supine: tight pecs noted L>R    Palpation: No tenderness noted grossly to L shoulder    ROM:  - passive  L shoulder flexion: 105  L shoulder ER: 35  L shoulder IR:  65    Strength: Deferred to next progress note       CLINICAL DECISION MAKING:   Outcome Measure: Tool Used: Disabilities of the Arm, Shoulder and Hand (DASH) Questionnaire - Quick Version  Score:  Initial: 40/55  Most Recent: X/55 (Date: -- )   Interpretation of Score: The DASH is designed to measure the activities of daily living in person's with upper extremity dysfunction or pain. Each section is scored on a 1-5 scale, 5 representing the greatest disability. The scores of each section are added together for a total score of 55. Medical Necessity:   · Patient demonstrates good rehab potential due to higher previous functional level.   Reason for Services/Other Comments:  · Patient continues to require skilled intervention due to recent rotator cuff surgery impairing the function of the L arm. TREATMENT:   (In addition to Assessment/Re-Assessment sessions the following treatments were rendered)      Present Symptoms/Complaints - 8/14/2018:  Pt returns after a ~6 week break from PT due to having a new baby. He says his pain is pretty much gone, he's been doing his HEP and he can lift his arm overhead well. Pain: Initial:   Pain Intensity 1: 0 - at rest Post Session:  Unchanged       Therapeutic Exercise: (15 minutes):  Exercises per grid below to improve mobility, strength and balance. Required moderate visual, verbal and tactile cues to promote proper body alignment, promote proper body posture and promote proper body mechanics. Progressed resistance, range and repetitions as indicated.      Date:  5/31/18 Date:  6/12/18 Date:  6/14/18 Date:  6/25/18 Date:  6/28/18 Date:  8/14/18   Activity/Exercise Parameters Parameters Parameters      Pt education Findings, anatomy/pathology, POC, HEP   Education on anatomy and surgery     UBE   L1 10 min, L UE passive L4.0 5/5 L4.0 5/5    Shoulder flexion AAROM  Supine w/ other hand x20    Stretch on wall 30s hold   Pulley  Abduction 10s holds x15 Abduction 10s holds x15      Shld isometric - abd  Wall 5s x10; manual resistance x30 Manual x30      Shld isometric ER  Wall 5s x10; manual resistance x30 Manual to fatigue varying speeds      Shld isometric IR  Manual resistance x30 Manual to fatigue varying speeds      shld extension  Standing AROM x30  Elbows flexed 20x     Rows     BTB 30x    Shld abduction    S/lying, small ROM 2 x 10 S/lying, small ROM 2 x 10 1# 10x, 2# 10x   Shld ER    - sitting 20x  - sidelying 20x Sidelying 3 x 10 3# 2 x 10   Shld IR    - RTB gentle 20x  Diagonal motion 3# cable 10x   Shld flexion      1# 10x, 2# 10x   Abduction stretch against wall      30s hold       Manual Therapy: (15 minutes): was utilized and necessary because of the patient's restricted joint motion and restricted motion of soft tissue.   - PROM L shoulder flexion, abduction and lats stretch  - Inferior capsule joint mobs grades II - III at varying degrees of abduction    Modalities (0 min):  - Vasopneumatic compression L shoulder for 15 min, 34 deg, high compression    Treatment/Session Assessment:  Pt had some fatigue at end of session with introduction of new rotator cuff strengthening exercises. · Compliance with Program/Exercises: Good  · Recommendations/Intent for next treatment session: Next visit will focus on advancements to more challenging activities. · Variance from plan of care: None    HEP Access Code: [email or web address]   URL: [email or web address]   Exercises   Supine Shoulder Flexion AAROM with Hands Clasped - 20 reps - 1x daily   Isometric Shoulder External Rotation at Wall - 10-15 reps - 10s hold - 2x daily   Isometric Shoulder Flexion at Wall - 10s hold - 10-15 reps - 1 sets - 2x daily   Standing Isometric Shoulder Abduction with Doorway - Arm Bent - 20 reps - 5s hold - 2x daily   Single Arm Shoulder Extension with Dumbbell - 10 reps - 3 sets   Sidelying Shoulder Abduction Palm Forward - 10 reps - 3 sets - 1x daily   Sidelying Shoulder External Rotation - 10 reps - 3 sets - 1x daily   Standing Shoulder Row with Anchored Resistance - 10 reps - 3 sets   Prone Shoulder Row - 10 reps - 3 sets   Seated Overhead Shoulder External Rotation Stretch with Towel - 10 reps       Total Treatment Duration: Re-evaluation 15 minutes, Treatment 30 minutes  PT Patient Time In/Time Out  Time In: 1355  Time Out: 1440    Nadira Mccord PT

## 2018-08-21 ENCOUNTER — HOSPITAL ENCOUNTER (OUTPATIENT)
Dept: PHYSICAL THERAPY | Age: 53
Discharge: HOME | End: 2018-08-21
Payer: COMMERCIAL

## 2018-08-21 PROCEDURE — 97140 MANUAL THERAPY 1/> REGIONS: CPT

## 2018-08-21 PROCEDURE — 97110 THERAPEUTIC EXERCISES: CPT

## 2018-08-21 PROCEDURE — 97016 VASOPNEUMATIC DEVICE THERAPY: CPT

## 2018-08-21 NOTE — PROGRESS NOTES
Aba Singleton Oppenheim  : 1965  Payor: Lj Justice / Plan: [employer] HMO / Product Type: HMO /  [address]  at [facility]  [address], [address], [facility] [address]  [phone]   [phone]       OUTPATIENT PHYSICAL THERAPY:Daily Note 2018    ICD-10: Treatment Diagnosis: Complete rotator cuff tear or rupture of left shoulder, not specified as traumatic (M75.122), Encounter for other orthopedic aftercare (Z47.89), Pain in left shoulder (M25.512), Stiffness of left shoulder, not elsewhere classified (M25.612), Muscle wasting and atrophy, not elsewhere classified, left shoulder (M62.512)  Precautions/Allergies:   Review of patient's allergies indicates no known allergies. Fall Risk Score: 1 (? 5 = High Risk)  MD Orders: PT eval and treat MEDICAL/REFERRING DIAGNOSIS:  shoulder, left   DATE OF ONSET: surgery 18  REFERRING PHYSICIAN: Dea Fan MD  RETURN PHYSICIAN APPOINTMENT: 18     ASSESSMENT:  Mr. Ana Barber participated in 7 visits of skilled PT in May/ and now returns after a 6 week break due to personal reasons. Pt has good overhead range of motion, however still has some tightness and restriction in the joint capsule and latissimus dorsi. He demonstrates mostly 4-/5 strength in the L UE and 4/5 strength of the L scapular muscles. Pt will benefit from continued skilled PT to reach his goals and return to PLOF. PROBLEM LIST (Impacting functional limitations):  1. Decreased Strength  2. Decreased ADL/Functional Activities  3. Increased Pain  4. Decreased Activity Tolerance  5. Decreased Flexibility/Joint Mobility  6. Edema/Girth INTERVENTIONS PLANNED:  1. Cold  2. Continuous Passive Motion (CPM)  3. Electrical Stimulation  4. Heat  5. Home Exercise Program (HEP)  6. Manual Therapy  7. Neuromuscular Re-education/Strengthening  8. Range of Motion (ROM)  9. Therapeutic Activites  10.  Therapeutic Exercise/Strengthening TREATMENT PLAN - UPDATED ON 8/14/18:  Effective Dates: 8/14/2018 TO 10/14/2018. Frequency/Duration: 2 times a week for 6 weeks    GOALS: (Goals have been discussed and agreed upon with patient.) - UPDATED ON 8/14/18:  Short-Term Functional Goals: Time Frame: 3 weeks  1. Pt will be I and compliant with initial HEP- met  2. Pt will demonstrate minimum 140 degrees AROM L shoulder flexion and abduction - met  3. Pt will demonstrate at least 75 degrees ER and IR of L shoulder- partially met  4. Pt will demonstrate at least 4/5 muscle strength throughout L UE- ongoing  5. Pt will be able to lift light objects overhead for placing on shelves/cabinets- met  6. Pt will score no more than 28/50 on Quick DASH  Discharge Goals: Time Frame: 6 weeks  1. Pt will demonstrate full overhead range of motion of L shoulder for ability to lift and carry objects overhead in function  2. Pt will demonstrate WNL B UE strength for ability to lift and carry heavy objects and bags  3. Pt will score no more than 15/50 on Quick DASH indicating excellent function of L UE  4. Pt will be able to lift and carry his child with no restrictions or modifications  5. Pt will participate in gym workouts fully and comply with recommendations for specific HEP  6. Pt will participate in house and yardwork with max 10% restrictions or modifications  Rehabilitation Potential For Stated Goals: Excellent              The information in this section was collected on 5/31/18 (except where otherwise noted). HISTORY:   History of Present Injury/Illness (Reason for Referral): Aba Hendrix presents s/p left side RTC repair and debridement of bicep long-head, labral, articular cartilage and bursal tissues. He had been having a gradual onset of pain and went to MD to seek treatment; no specific injury.   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 No other significant medical history noted. Currently still wearing a sling, and pt reports that he was instructed by MD to go slow with his rehab to allow for proper healing. Pain location: L shoulder  Pain levels - Current: 0/10, mild increases in pain with movement   Increases pain: Movement   Decreases pain: Rest    Past Medical History/Comorbidities:   Mr. Asif Merrill  has a past medical history of Adverse effect of anesthesia; Chronic pain; Hypertension; and Migraine. Mr. Asif Merrill  has a past surgical history that includes hx knee arthroscopy (Right, 09/1984); hx rotator cuff repair (Right, 2005); and hx back surgery (2005). 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    Functional Limitations: Limited use of L arm for lifting and movement    Current Medications:       Current Outpatient Prescriptions:     sildenafil citrate (VIAGRA) 100 mg tablet, TAKE 1 TABLET BY MOUTH EVERY DAY AS NEEDED, Disp: 30 Tab, Rfl: 0    quinapril (ACCUPRIL) 10 mg tablet, TAKE 1 TABLET BY MOUTH EVERY DAY, Disp: 30 Tab, Rfl: 5    finasteride (PROPECIA) 1 mg tablet, TAKE 1 TABLET BY MOUTH DAILY, Disp: 90 Tab, Rfl: 0    hydroCHLOROthiazide (HYDRODIURIL) 25 mg tablet, TAKE 1 TABLET BY MOUTH EVERY DAY, Disp: 90 Tab, Rfl: 0    acetaminophen/diphenhydramine (TYLENOL PM PO), Take 1 Tab by mouth daily. , Disp: , Rfl:     potassium 99 mg tablet, Take 99 mg by mouth daily. , Disp: , Rfl:     levocarnitine HCl (ACETYL-L-CARNITINE), Take 1 Tab by mouth daily. , Disp: , Rfl:     ascorbic acid, vitamin C, (VITAMIN C) 500 mg tablet, Take 500 mg by mouth daily. , Disp: , Rfl:     hydroCHLOROthiazide (HYDRODIURIL) 25 mg tablet, TAKE 1 TABLET BY MOUTH EVERY DAY, Disp: 90 Tab, Rfl: 0    valACYclovir (VALTREX) 1 gram tablet, TAKE 1 TABLET BY MOUTH EVERY 12 HOURS FOR 10 DAYS (Patient taking differently: TAKE 1 TABLET BY MOUTH EVERY 12 HOURS FOR 10 DAYS+---prn), Disp: 20 Tab, Rfl: 2    aspirin 81 mg tablet, Take 81 mg by mouth daily. , Disp: , Rfl:     MULTIVITAMIN PO, Take 1 Tab by mouth daily. , Disp: , Rfl:     omega-3 fatty acids-vitamin e (FISH OIL) 1,000 mg Cap, Take 1,000 mg by mouth daily. , Disp: , Rfl:     coenzyme q10 (COQ-10) 100 mg Cap, Take 1 Tab by mouth daily. , Disp: , Rfl:    Date Last Reviewed:  8/21/2018     EXAMINATION:   Re-evaluation 8/14/18:    Shoulder AROM R L   Flexion 160 138   Abduction 165 150   ER 75 65   IR WNL WNL   Extension 75 65     Shoulder  R L   Flexion 5 4-   Abduction 5 4-   ER 5 4-   IR 5 4+   Extension 5 4   Mid Trap 5 4-      Tests: Inferior capsule - moderate restriction most notably in abduction range of 90 degrees and above    Initial evaluation:    Observation:  Surgical incisions healing well, acromion is prominent where pt had previous shoulder separation, decreased muscle mass noted of L deltoids and infraspinatus    Postural Assessment:  Rounded forward shoulders, L more than R   Supine: tight pecs noted L>R    Palpation: No tenderness noted grossly to L shoulder    ROM:  - passive  L shoulder flexion: 105  L shoulder ER: 35  L shoulder IR:  65    Strength: Deferred to next progress note       CLINICAL DECISION MAKING:   Outcome Measure: Tool Used: Disabilities of the Arm, Shoulder and Hand (DASH) Questionnaire - Quick Version  Score:  Initial: 40/55  Most Recent: X/55 (Date: -- )   Interpretation of Score: The DASH is designed to measure the activities of daily living in person's with upper extremity dysfunction or pain. Each section is scored on a 1-5 scale, 5 representing the greatest disability. The scores of each section are added together for a total score of 55. Medical Necessity:   · Patient demonstrates good rehab potential due to higher previous functional level.   Reason for Services/Other Comments:  · Patient continues to require skilled intervention due to recent rotator cuff surgery impairing the function of the L arm. TREATMENT:   (In addition to Assessment/Re-Assessment sessions the following treatments were rendered)      Present Symptoms/Complaints - 8/21/2018:  Pt reports he has been doing fine and has been diligent with his stretching. Pain: Initial:   Pain Intensity 1: 0  Post Session:  Unchanged       Therapeutic Exercise: (40 minutes):  Exercises per grid below to improve mobility, strength and balance. Required moderate visual, verbal and tactile cues to promote proper body alignment, promote proper body posture and promote proper body mechanics. Progressed resistance, range and repetitions as indicated.      Date:  6/12/18 Date:  6/14/18 Date:  6/25/18 Date:  6/28/18 Date:  8/14/18 Date:  8/21/18   Activity/Exercise Parameters Parameters       Pt education   Education on anatomy and surgery      UBE  L1 10 min, L UE passive L4.0 5/5 L4.0 5/5  L4.5 5/5   Shoulder flexion AAROM Supine w/ other hand x20    Stretch on wall 30s hold    Pulley Abduction 10s holds x15 Abduction 10s holds x15    Abduction  and IR behind back 20s holds x5   Shld isometric - abd Wall 5s x10; manual resistance x30 Manual x30       Shld isometric ER Wall 5s x10; manual resistance x30 Manual to fatigue varying speeds       Shld isometric IR Manual resistance x30 Manual to fatigue varying speeds       shld extension Standing AROM x30  Elbows flexed 20x      Rows    BTB 30x     Shld abduction   S/lying, small ROM 2 x 10 S/lying, small ROM 2 x 10 1# 10x, 2# 10x 2# to 90 deg 20x   Shld ER   - sitting 20x  - sidelying 20x Sidelying 3 x 10 3# 2 x 10 Sidelying 3# 2 x 10   Shld IR   - RTB gentle 20x  Diagonal motion 3# cable 10x Across body/diagonal, BTB 20x   Shld flexion     1# 10x, 2# 10x 2# 3 x 10 OH   Abduction stretch against wall     30s hold    OH press      3# 15x   TRX row      20x       Manual Therapy: (15 minutes): was utilized and necessary because of the patient's restricted joint motion and restricted motion of soft tissue.   - PROM L shoulder flexion, abduction, ER and lats stretch  - Inferior capsule joint mobs grades II - III at varying degrees of abduction    Modalities (10 min):  - Vasopneumatic compression L shoulder for 15 min, 34 deg, high compression    Treatment/Session Assessment:  Pt has decreased muscle spasm and improved mobility vs last session. Pt fatigues quickly with rotator cuff therex. · Compliance with Program/Exercises: Good  · Recommendations/Intent for next treatment session: Next visit will focus on advancements to more challenging activities. · Variance from plan of care: None    HEP Access Code: [email or web address]   URL: [email or web address]   Exercises   Supine Shoulder Flexion AAROM with Hands Clasped - 20 reps - 1x daily   Isometric Shoulder External Rotation at Wall - 10-15 reps - 10s hold - 2x daily   Isometric Shoulder Flexion at Wall - 10s hold - 10-15 reps - 1 sets - 2x daily   Standing Isometric Shoulder Abduction with Doorway - Arm Bent - 20 reps - 5s hold - 2x daily   Single Arm Shoulder Extension with Dumbbell - 10 reps - 3 sets   Sidelying Shoulder Abduction Palm Forward - 10 reps - 3 sets - 1x daily   Sidelying Shoulder External Rotation - 10 reps - 3 sets - 1x daily   Standing Shoulder Row with Anchored Resistance - 10 reps - 3 sets   Prone Shoulder Row - 10 reps - 3 sets   Seated Overhead Shoulder External Rotation Stretch with Towel - 10 reps       Total Treatment Duration:   PT Patient Time In/Time Out  Time In: 1430  Time Out: 1540    Flaquita Pinedo, PT

## 2018-08-23 ENCOUNTER — HOSPITAL ENCOUNTER (OUTPATIENT)
Dept: PHYSICAL THERAPY | Age: 53
Discharge: HOME | End: 2018-08-23
Payer: COMMERCIAL

## 2018-08-23 PROCEDURE — 97016 VASOPNEUMATIC DEVICE THERAPY: CPT

## 2018-08-23 PROCEDURE — 97110 THERAPEUTIC EXERCISES: CPT

## 2018-08-23 NOTE — PROGRESS NOTES
Aba Garcia  : 1965  Payor: Marco Rounds / Plan: [address] HMO / Product Type: HMO /  [address]  at [address]  [address], [address], [facility] [address]  [phone]   [phone]       OUTPATIENT PHYSICAL THERAPY:Daily Note 2018    ICD-10: Treatment Diagnosis: Complete rotator cuff tear or rupture of left shoulder, not specified as traumatic (M75.122), Encounter for other orthopedic aftercare (Z47.89), Pain in left shoulder (M25.512), Stiffness of left shoulder, not elsewhere classified (M25.612), Muscle wasting and atrophy, not elsewhere classified, left shoulder (M62.512)  Precautions/Allergies:   Review of patient's allergies indicates no known allergies. Fall Risk Score: 1 (? 5 = High Risk)  MD Orders: PT eval and treat MEDICAL/REFERRING DIAGNOSIS:  shoulder, left   DATE OF ONSET: surgery 18  REFERRING PHYSICIAN: Nohelia Mora MD  RETURN PHYSICIAN APPOINTMENT: 18     ASSESSMENT:  Mr. Asif Merrill participated in 7 visits of skilled PT in May/ and now returns after a 6 week break due to personal reasons. Pt has good overhead range of motion, however still has some tightness and restriction in the joint capsule and latissimus dorsi. He demonstrates mostly 4-/5 strength in the L UE and 4/5 strength of the L scapular muscles. Pt will benefit from continued skilled PT to reach his goals and return to PLOF. PROBLEM LIST (Impacting functional limitations):  1. Decreased Strength  2. Decreased ADL/Functional Activities  3. Increased Pain  4. Decreased Activity Tolerance  5. Decreased Flexibility/Joint Mobility  6. Edema/Girth INTERVENTIONS PLANNED:  1. Cold  2. Continuous Passive Motion (CPM)  3. Electrical Stimulation  4. Heat  5. Home Exercise Program (HEP)  6. Manual Therapy  7. Neuromuscular Re-education/Strengthening  8. Range of Motion (ROM)  9. Therapeutic Activites  10.  Therapeutic Exercise/Strengthening TREATMENT PLAN - UPDATED ON 8/14/18:  Effective Dates: 8/14/2018 TO 10/14/2018. Frequency/Duration: 2 times a week for 6 weeks    GOALS: (Goals have been discussed and agreed upon with patient.) - UPDATED ON 8/14/18:  Short-Term Functional Goals: Time Frame: 3 weeks  1. Pt will be I and compliant with initial HEP- met  2. Pt will demonstrate minimum 140 degrees AROM L shoulder flexion and abduction - met  3. Pt will demonstrate at least 75 degrees ER and IR of L shoulder- partially met  4. Pt will demonstrate at least 4/5 muscle strength throughout L UE- ongoing  5. Pt will be able to lift light objects overhead for placing on shelves/cabinets- met  6. Pt will score no more than 28/50 on Quick DASH  Discharge Goals: Time Frame: 6 weeks  1. Pt will demonstrate full overhead range of motion of L shoulder for ability to lift and carry objects overhead in function  2. Pt will demonstrate WNL B UE strength for ability to lift and carry heavy objects and bags  3. Pt will score no more than 15/50 on Quick DASH indicating excellent function of L UE  4. Pt will be able to lift and carry his child with no restrictions or modifications  5. Pt will participate in gym workouts fully and comply with recommendations for specific HEP  6. Pt will participate in house and yardwork with max 10% restrictions or modifications  Rehabilitation Potential For Stated Goals: Excellent              The information in this section was collected on 5/31/18 (except where otherwise noted). HISTORY:   History of Present Injury/Illness (Reason for Referral): Aba Boogie presents s/p left side RTC repair and debridement of bicep long-head, labral, articular cartilage and bursal tissues. He had been having a gradual onset of pain and went to MD to seek treatment; no specific injury.   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 No other significant medical history noted. Currently still wearing a sling, and pt reports that he was instructed by MD to go slow with his rehab to allow for proper healing. Pain location: L shoulder  Pain levels - Current: 0/10, mild increases in pain with movement   Increases pain: Movement   Decreases pain: Rest    Past Medical History/Comorbidities:   Mr. Gerardo Whyte  has a past medical history of Adverse effect of anesthesia; Chronic pain; Hypertension; and Migraine. Mr. Gerardo Whyte  has a past surgical history that includes hx knee arthroscopy (Right, 09/1984); hx rotator cuff repair (Right, 2005); and hx back surgery (2005). 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    Functional Limitations: Limited use of L arm for lifting and movement    Current Medications:       Current Outpatient Prescriptions:     sildenafil citrate (VIAGRA) 100 mg tablet, TAKE 1 TABLET BY MOUTH EVERY DAY AS NEEDED, Disp: 30 Tab, Rfl: 0    quinapril (ACCUPRIL) 10 mg tablet, TAKE 1 TABLET BY MOUTH EVERY DAY, Disp: 30 Tab, Rfl: 5    finasteride (PROPECIA) 1 mg tablet, TAKE 1 TABLET BY MOUTH DAILY, Disp: 90 Tab, Rfl: 0    hydroCHLOROthiazide (HYDRODIURIL) 25 mg tablet, TAKE 1 TABLET BY MOUTH EVERY DAY, Disp: 90 Tab, Rfl: 0    acetaminophen/diphenhydramine (TYLENOL PM PO), Take 1 Tab by mouth daily. , Disp: , Rfl:     potassium 99 mg tablet, Take 99 mg by mouth daily. , Disp: , Rfl:     levocarnitine HCl (ACETYL-L-CARNITINE), Take 1 Tab by mouth daily. , Disp: , Rfl:     ascorbic acid, vitamin C, (VITAMIN C) 500 mg tablet, Take 500 mg by mouth daily. , Disp: , Rfl:     hydroCHLOROthiazide (HYDRODIURIL) 25 mg tablet, TAKE 1 TABLET BY MOUTH EVERY DAY, Disp: 90 Tab, Rfl: 0    valACYclovir (VALTREX) 1 gram tablet, TAKE 1 TABLET BY MOUTH EVERY 12 HOURS FOR 10 DAYS (Patient taking differently: TAKE 1 TABLET BY MOUTH EVERY 12 HOURS FOR 10 DAYS+---prn), Disp: 20 Tab, Rfl: 2    aspirin 81 mg tablet, Take 81 mg by mouth daily. , Disp: , Rfl:     MULTIVITAMIN PO, Take 1 Tab by mouth daily. , Disp: , Rfl:     omega-3 fatty acids-vitamin e (FISH OIL) 1,000 mg Cap, Take 1,000 mg by mouth daily. , Disp: , Rfl:     coenzyme q10 (COQ-10) 100 mg Cap, Take 1 Tab by mouth daily. , Disp: , Rfl:    Date Last Reviewed:  8/23/2018     EXAMINATION:   Re-evaluation 8/14/18:    Shoulder AROM R L   Flexion 160 138   Abduction 165 150   ER 75 65   IR WNL WNL   Extension 75 65     Shoulder  R L   Flexion 5 4-   Abduction 5 4-   ER 5 4-   IR 5 4+   Extension 5 4   Mid Trap 5 4-      Tests: Inferior capsule - moderate restriction most notably in abduction range of 90 degrees and above    Initial evaluation:    Observation:  Surgical incisions healing well, acromion is prominent where pt had previous shoulder separation, decreased muscle mass noted of L deltoids and infraspinatus    Postural Assessment:  Rounded forward shoulders, L more than R   Supine: tight pecs noted L>R    Palpation: No tenderness noted grossly to L shoulder    ROM:  - passive  L shoulder flexion: 105  L shoulder ER: 35  L shoulder IR:  65    Strength: Deferred to next progress note       CLINICAL DECISION MAKING:   Outcome Measure: Tool Used: Disabilities of the Arm, Shoulder and Hand (DASH) Questionnaire - Quick Version  Score:  Initial: 40/55  Most Recent: X/55 (Date: -- )   Interpretation of Score: The DASH is designed to measure the activities of daily living in person's with upper extremity dysfunction or pain. Each section is scored on a 1-5 scale, 5 representing the greatest disability. The scores of each section are added together for a total score of 55. Medical Necessity:   · Patient demonstrates good rehab potential due to higher previous functional level.   Reason for Services/Other Comments:  · Patient continues to require skilled intervention due to recent rotator cuff surgery impairing the function of the L arm. TREATMENT:   (In addition to Assessment/Re-Assessment sessions the following treatments were rendered)      Present Symptoms/Complaints - 8/23/2018:  Pt reports he is feeling good, last session was fatiguing. Pain: Initial:   Pain Intensity 1: 0  Post Session:  Unchanged       Therapeutic Exercise: (45 minutes):  Exercises per grid below to improve mobility, strength and balance. Required moderate visual, verbal and tactile cues to promote proper body alignment, promote proper body posture and promote proper body mechanics. Progressed resistance, range and repetitions as indicated.      Date:  6/14/18 Date:  6/25/18 Date:  6/28/18 Date:  8/14/18 Date:  8/21/18 Date:  8/23/18   Activity/Exercise Parameters        Pt education  Education on anatomy and surgery       UBE L1 10 min, L UE passive L4.0 5/5 L4.0 5/5  L4.5 5/5 L4.5 5/5   Shoulder flexion AAROM    Stretch on wall 30s hold     Pulley Abduction 10s holds x15    Abduction  and IR behind back 20s holds x5    Shld isometric - abd Manual x30        Shld isometric ER Manual to fatigue varying speeds        Shld isometric IR Manual to fatigue varying speeds        shld extension  Elbows flexed 20x       Rows   BTB 30x      Shld abduction  S/lying, small ROM 2 x 10 S/lying, small ROM 2 x 10 1# 10x, 2# 10x 2# to 90 deg 20x 2# to 90 deg 20x, then full OH 20x   Shld ER  - sitting 20x  - sidelying 20x Sidelying 3 x 10 3# 2 x 10 Sidelying 3# 2 x 10 Bilateral RTB 3 x 10   Shld IR  - RTB gentle 20x  Diagonal motion 3# cable 10x Across body/diagonal, BTB 20x Across body/diagonal, BTB 20x   Shld flexion    1# 10x, 2# 10x 2# 3 x 10 OH 3# 3 x 10 OH   Abduction stretch against wall    30s hold     OH press     3# 15x    TRX row     20x 20x   Wall pushups      20x   Sidebend stretch arms OH      Long hold   Prayer stretch      Long holds fwd and sideways   Q-ped arm raises      Raise only R arm, short hold, 3 x 10 cue for serratus activation       Manual Therapy: (0 minutes): was utilized and necessary because of the patient's restricted joint motion and restricted motion of soft tissue.   - PROM L shoulder flexion, abduction, ER and lats stretch  - Inferior capsule joint mobs grades II - III at varying degrees of abduction    Modalities (10 min):  - Vasopneumatic compression L shoulder for 10 min, 34 deg, high compression    Treatment/Session Assessment:  Pt had no discomfort with any exercises today and was able to utilize more intense shoulder stretching today with prayer pose and overhead sidebend stretch for lats. RTC therex continues to be fatiguing. · Compliance with Program/Exercises: Good  · Recommendations/Intent for next treatment session: Next visit will focus on advancements to more challenging activities. · Variance from plan of care: None    HEP Access Code: [email or web address]   URL: [email or web address]   Exercises   Seated Overhead Shoulder External Rotation Stretch with Towel - 10 reps   Child's Pose Stretch - 10 reps - 3 sets - 4x weekly   Child's Pose with Sidebending - 10 reps - 3 sets - 4x weekly   Standing Shoulder Internal Rotation Stretch with Towel - 10 reps - 10s hold   Shoulder Abduction with Dumbbells - Thumbs Up - 10 reps - 3 sets - 4x weekly   Standing Shoulder Flexion Full Range - 10 reps - 3 sets - 4x weekly   Sidelying Shoulder External Rotation - 10 reps - 3 sets - 1x daily   TL Sidebending Stretch - Arms Overhead - 3 sets - 30s hold - 1x daily   Shoulder Overhead Press in Flexion with Dumbbells - 10 reps - 3 sets - 4x weekly   Standing Single Arm Shoulder PNF D1 Flexion with Anchored Resistance - 10 reps - 3 sets - 4x weekly   Prone Shoulder Row - 10 reps - 3 sets   Quadruped Alternating Arm Lift - 10 reps - 3 sets - 4x weekly   Shoulder External Rotation and Scapular Retraction with Resistance - 10 reps - 3 sets - 4x weekly   Wall Push Up with Arms Wide - 10 reps - 3 sets - 4x weekly     Total Treatment Duration:   PT Patient Time In/Time Out  Time In: 1000  Time Out: 1055    Michael Modi, PT

## 2018-08-28 ENCOUNTER — HOSPITAL ENCOUNTER (OUTPATIENT)
Dept: PHYSICAL THERAPY | Age: 53
Discharge: HOME | End: 2018-08-28
Payer: COMMERCIAL

## 2018-08-28 PROCEDURE — 97110 THERAPEUTIC EXERCISES: CPT

## 2018-08-28 PROCEDURE — 97016 VASOPNEUMATIC DEVICE THERAPY: CPT

## 2018-08-28 NOTE — PROGRESS NOTES
Aba King  : 1965  Payor: Adrianmelanie Olivier / Plan: [employer] ([employer] HMO / Product Type: HMO /  [address]  at [facility]  [address], [facility] [address]  [phone]   [phone]       OUTPATIENT PHYSICAL THERAPY:Daily Note 2018    ICD-10: Treatment Diagnosis: Complete rotator cuff tear or rupture of left shoulder, not specified as traumatic (M75.122), Encounter for other orthopedic aftercare (Z47.89), Pain in left shoulder (M25.512), Stiffness of left shoulder, not elsewhere classified (M25.612), Muscle wasting and atrophy, not elsewhere classified, left shoulder (M62.512)  Precautions/Allergies:   Review of patient's allergies indicates no known allergies. Fall Risk Score: 1 (? 5 = High Risk)  MD Orders: PT eval and treat MEDICAL/REFERRING DIAGNOSIS:  shoulder, left   DATE OF ONSET: surgery 18  REFERRING PHYSICIAN: Jose David Ruff MD  RETURN PHYSICIAN APPOINTMENT: 18     ASSESSMENT:  Mr. Lisa Heck participated in 7 visits of skilled PT in May/ and now returns after a 6 week break due to personal reasons. Pt has good overhead range of motion, however still has some tightness and restriction in the joint capsule and latissimus dorsi. He demonstrates mostly 4-/5 strength in the L UE and 4/5 strength of the L scapular muscles. Pt will benefit from continued skilled PT to reach his goals and return to PLOF. PROBLEM LIST (Impacting functional limitations):  1. Decreased Strength  2. Decreased ADL/Functional Activities  3. Increased Pain  4. Decreased Activity Tolerance  5. Decreased Flexibility/Joint Mobility  6. Edema/Girth INTERVENTIONS PLANNED:  1. Cold  2. Continuous Passive Motion (CPM)  3. Electrical Stimulation  4. Heat  5. Home Exercise Program (HEP)  6. Manual Therapy  7. Neuromuscular Re-education/Strengthening  8. Range of Motion (ROM)  9. Therapeutic Activites  10.  Therapeutic Exercise/Strengthening TREATMENT PLAN - UPDATED ON 8/14/18:  Effective Dates: 8/14/2018 TO 10/14/2018. Frequency/Duration: 2 times a week for 6 weeks    GOALS: (Goals have been discussed and agreed upon with patient.) - UPDATED ON 8/14/18:  Short-Term Functional Goals: Time Frame: 3 weeks  1. Pt will be I and compliant with initial HEP- met  2. Pt will demonstrate minimum 140 degrees AROM L shoulder flexion and abduction - met  3. Pt will demonstrate at least 75 degrees ER and IR of L shoulder- partially met  4. Pt will demonstrate at least 4/5 muscle strength throughout L UE- ongoing  5. Pt will be able to lift light objects overhead for placing on shelves/cabinets- met  6. Pt will score no more than 28/50 on Quick DASH  Discharge Goals: Time Frame: 6 weeks  1. Pt will demonstrate full overhead range of motion of L shoulder for ability to lift and carry objects overhead in function  2. Pt will demonstrate WNL B UE strength for ability to lift and carry heavy objects and bags  3. Pt will score no more than 15/50 on Quick DASH indicating excellent function of L UE  4. Pt will be able to lift and carry his child with no restrictions or modifications  5. Pt will participate in gym workouts fully and comply with recommendations for specific HEP  6. Pt will participate in house and yardwork with max 10% restrictions or modifications  Rehabilitation Potential For Stated Goals: Excellent              The information in this section was collected on 5/31/18 (except where otherwise noted). HISTORY:   History of Present Injury/Illness (Reason for Referral): Aba Thomas Goes presents s/p left side RTC repair and debridement of bicep long-head, labral, articular cartilage and bursal tissues. He had been having a gradual onset of pain and went to MD to seek treatment; no specific injury.   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 No other significant medical history noted. Currently still wearing a sling, and pt reports that he was instructed by MD to go slow with his rehab to allow for proper healing. Pain location: L shoulder  Pain levels - Current: 0/10, mild increases in pain with movement   Increases pain: Movement   Decreases pain: Rest    Past Medical History/Comorbidities:   Mr. Paras Riggins  has a past medical history of Adverse effect of anesthesia; Chronic pain; Hypertension; and Migraine. Mr. Paras Riggins  has a past surgical history that includes hx knee arthroscopy (Right, 09/1984); hx rotator cuff repair (Right, 2005); and hx back surgery (2005). 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    Functional Limitations: Limited use of L arm for lifting and movement    Current Medications:       Current Outpatient Prescriptions:     hydroCHLOROthiazide (HYDRODIURIL) 25 mg tablet, TAKE 1 TABLET BY MOUTH EVERY DAY, Disp: 90 Tab, Rfl: 0    sildenafil citrate (VIAGRA) 100 mg tablet, TAKE 1 TABLET BY MOUTH EVERY DAY AS NEEDED, Disp: 30 Tab, Rfl: 0    quinapril (ACCUPRIL) 10 mg tablet, TAKE 1 TABLET BY MOUTH EVERY DAY, Disp: 30 Tab, Rfl: 5    finasteride (PROPECIA) 1 mg tablet, TAKE 1 TABLET BY MOUTH DAILY, Disp: 90 Tab, Rfl: 0    acetaminophen/diphenhydramine (TYLENOL PM PO), Take 1 Tab by mouth daily. , Disp: , Rfl:     potassium 99 mg tablet, Take 99 mg by mouth daily. , Disp: , Rfl:     levocarnitine HCl (ACETYL-L-CARNITINE), Take 1 Tab by mouth daily. , Disp: , Rfl:     ascorbic acid, vitamin C, (VITAMIN C) 500 mg tablet, Take 500 mg by mouth daily. , Disp: , Rfl:     valACYclovir (VALTREX) 1 gram tablet, TAKE 1 TABLET BY MOUTH EVERY 12 HOURS FOR 10 DAYS (Patient taking differently: TAKE 1 TABLET BY MOUTH EVERY 12 HOURS FOR 10 DAYS+---prn), Disp: 20 Tab, Rfl: 2    aspirin 81 mg tablet, Take 81 mg by mouth daily. , Disp: , Rfl:     MULTIVITAMIN PO, Take 1 Tab by mouth daily. , Disp: , Rfl:     omega-3 fatty acids-vitamin e (FISH OIL) 1,000 mg Cap, Take 1,000 mg by mouth daily. , Disp: , Rfl:    Date Last Reviewed:  8/28/2018     EXAMINATION:   Re-evaluation 8/14/18:    Shoulder AROM R L   Flexion 160 138   Abduction 165 150   ER 75 65   IR WNL WNL   Extension 75 65     Shoulder  R L   Flexion 5 4-   Abduction 5 4-   ER 5 4-   IR 5 4+   Extension 5 4   Mid Trap 5 4-      Tests: Inferior capsule - moderate restriction most notably in abduction range of 90 degrees and above    Initial evaluation:    Observation:  Surgical incisions healing well, acromion is prominent where pt had previous shoulder separation, decreased muscle mass noted of L deltoids and infraspinatus    Postural Assessment:  Rounded forward shoulders, L more than R   Supine: tight pecs noted L>R    Palpation: No tenderness noted grossly to L shoulder    ROM:  - passive  L shoulder flexion: 105  L shoulder ER: 35  L shoulder IR:  65    Strength: Deferred to next progress note       CLINICAL DECISION MAKING:   Outcome Measure: Tool Used: Disabilities of the Arm, Shoulder and Hand (DASH) Questionnaire - Quick Version  Score:  Initial: 40/55  Most Recent: X/55 (Date: -- )   Interpretation of Score: The DASH is designed to measure the activities of daily living in person's with upper extremity dysfunction or pain. Each section is scored on a 1-5 scale, 5 representing the greatest disability. The scores of each section are added together for a total score of 55. Medical Necessity:   · Patient demonstrates good rehab potential due to higher previous functional level. Reason for Services/Other Comments:  · Patient continues to require skilled intervention due to recent rotator cuff surgery impairing the function of the L arm.               TREATMENT:   (In addition to Assessment/Re-Assessment sessions the following treatments were rendered)    Present Symptoms/Complaints - 8/28/2018:  Pt reports no news with the shoulder, he has been doing his HEP. Pain: Initial:   Pain Intensity 1: 0  Post Session:  Unchanged       Therapeutic Exercise: (45 minutes):  Exercises per grid below to improve mobility, strength and balance. Required moderate visual, verbal and tactile cues to promote proper body alignment, promote proper body posture and promote proper body mechanics. Progressed resistance, range and repetitions as indicated.      Date:  6/25/18 Date:  6/28/18 Date:  8/14/18 Date:  8/21/18 Date:  8/23/18 Date:  8/28/18   Activity/Exercise         Pt education Education on anatomy and surgery        UBE L4.0 5/5 L4.0 5/5  L4.5 5/5 L4.5 5/5 L5.0 5/5   Shoulder flexion AAROM   Stretch on wall 30s hold      Pulley    Abduction  and IR behind back 20s holds x5     shld extension Elbows flexed 20x        Rows  BTB 30x       Shld abduction S/lying, small ROM 2 x 10 S/lying, small ROM 2 x 10 1# 10x, 2# 10x 2# to 90 deg 20x 2# to 90 deg 20x, then full OH 20x 3# 3 x 10 full OH ROM   Shld ER - sitting 20x  - sidelying 20x Sidelying 3 x 10 3# 2 x 10 Sidelying 3# 2 x 10 Bilateral RTB 3 x 10 Bilateral GTB 2 x 10   Shld IR - RTB gentle 20x  Diagonal motion 3# cable 10x Across body/diagonal, BTB 20x Across body/diagonal, BTB 20x    Shld flexion   1# 10x, 2# 10x 2# 3 x 10 OH 3# 3 x 10 OH 3# 3 x 10 OH   Abduction stretch against wall   30s hold      OH press    3# 15x     TRX row    20x 20x 20x   Wall pushups     20x On big green ball 20x   Sidebend stretch arms OH     Long hold Long hold   Prayer stretch     Long holds fwd and sideways    Q-ped arm raises     Raise only R arm, short hold, 3 x 10 cue for serratus activation Raise only R arm, short hold, 2 x 10 cue for serratus activation   Ball bounce against wall      In full forward elevation ROM, green ball, 2 sets to fatigue   Prone self-toss with ball (in 90/90 position) Several minutes       Manual Therapy: (0 minutes): was utilized and necessary because of the patient's restricted joint motion and restricted motion of soft tissue.   - PROM L shoulder flexion, abduction, ER and lats stretch  - Inferior capsule joint mobs grades II - III at varying degrees of abduction    Modalities (15 min):  - Vasopneumatic compression L shoulder for 15 min, 34 deg, high compression    Treatment/Session Assessment:  Pt has full IR range of motion behind his back which is an excellent improvement. Abduction with 3# was very fatiguing, min cues needed for slow pace in order to focus on RTC rather than overuse upper trap. Pt did very well with dynamic activities today and fatigued quickly with ball bounce against wall. · Compliance with Program/Exercises: Good  · Recommendations/Intent for next treatment session: Next visit will focus on advancements to more challenging activities. · Variance from plan of care: None    HEP Access Code: [email or web address]   URL: [email or web address]   Exercises   Seated Overhead Shoulder External Rotation Stretch with Towel - 10 reps   Child's Pose Stretch - 10 reps - 3 sets - 4x weekly   Child's Pose with Sidebending - 10 reps - 3 sets - 4x weekly   Standing Shoulder Internal Rotation Stretch with Towel - 10 reps - 10s hold   Shoulder Abduction with Dumbbells - Thumbs Up - 10 reps - 3 sets - 4x weekly   Standing Shoulder Flexion Full Range - 10 reps - 3 sets - 4x weekly   Sidelying Shoulder External Rotation - 10 reps - 3 sets - 1x daily   TL Sidebending Stretch - Arms Overhead - 3 sets - 30s hold - 1x daily   Shoulder Overhead Press in Flexion with Dumbbells - 10 reps - 3 sets - 4x weekly   Standing Single Arm Shoulder PNF D1 Flexion with Anchored Resistance - 10 reps - 3 sets - 4x weekly   Prone Shoulder Row - 10 reps - 3 sets   Quadruped Alternating Arm Lift - 10 reps - 3 sets - 4x weekly   Shoulder External Rotation and Scapular Retraction with Resistance - 10 reps - 3 sets - 4x weekly   Wall Push Up with Arms Wide - 10 reps - 3 sets - 4x weekly     Total Treatment Duration:   PT Patient Time In/Time Out  Time In: 1630  Time Out: 1730    Priyanka Freed, PT

## 2018-08-31 ENCOUNTER — APPOINTMENT (OUTPATIENT)
Dept: PHYSICAL THERAPY | Age: 53
End: 2018-08-31

## 2018-09-04 ENCOUNTER — HOSPITAL ENCOUNTER (OUTPATIENT)
Dept: PHYSICAL THERAPY | Age: 53
Discharge: HOME | End: 2018-09-04
Payer: COMMERCIAL

## 2018-09-04 PROCEDURE — 97110 THERAPEUTIC EXERCISES: CPT

## 2018-09-04 NOTE — PROGRESS NOTES
Aba Jacobs  : 1965 Payor: Emigdio Mahmoodie / Plan: [employer] / Product Type: HMO /  [address]  at [address], [address] [place] [address] [phone]   [phone] OUTPATIENT PHYSICAL THERAPY:Daily Note 2018 ICD-10: Treatment Diagnosis: Complete rotator cuff tear or rupture of left shoulder, not specified as traumatic (S02.490), Encounter for other orthopedic aftercare (Z47.89), Pain in left shoulder (M25.512), Stiffness of left shoulder, not elsewhere classified (M25.612), Muscle wasting and atrophy, not elsewhere classified, left shoulder (M62.512) Precautions/Allergies:  
Review of patient's allergies indicates no known allergies. Fall Risk Score: 1 (? 5 = High Risk) MD Orders: PT eval and treat MEDICAL/REFERRING DIAGNOSIS: 
shoulder, left DATE OF ONSET: surgery 18 REFERRING PHYSICIAN: Clarence Coombs MD 
RETURN PHYSICIAN APPOINTMENT: 18 ASSESSMENT:  Mr. Lee Ann Veliz participated in 7 visits of skilled PT in May/ and now returns after a 6 week break due to personal reasons. Pt has good overhead range of motion, however still has some tightness and restriction in the joint capsule and latissimus dorsi. He demonstrates mostly 4-/5 strength in the L UE and 4/5 strength of the L scapular muscles. Pt will benefit from continued skilled PT to reach his goals and return to PLOF. PROBLEM LIST (Impacting functional limitations): 1. Decreased Strength 2. Decreased ADL/Functional Activities 3. Increased Pain 4. Decreased Activity Tolerance 5. Decreased Flexibility/Joint Mobility 6. Edema/Girth INTERVENTIONS PLANNED: 
1. Cold 2. Continuous Passive Motion (CPM) 3. Electrical Stimulation 4. Heat 5. Home Exercise Program (HEP) 6. Manual Therapy 7. Neuromuscular Re-education/Strengthening 8. Range of Motion (ROM) 9. Therapeutic Activites 10. Therapeutic Exercise/Strengthening TREATMENT PLAN - UPDATED ON 8/14/18: 
Effective Dates: 8/14/2018 TO 10/14/2018. Frequency/Duration: 2 times a week for 6 weeks GOALS: (Goals have been discussed and agreed upon with patient.) - UPDATED ON 8/14/18: 
Short-Term Functional Goals: Time Frame: 3 weeks 1. Pt will be I and compliant with initial HEP- met 2. Pt will demonstrate minimum 140 degrees AROM L shoulder flexion and abduction - met 3. Pt will demonstrate at least 75 degrees ER and IR of L shoulder- partially met 4. Pt will demonstrate at least 4/5 muscle strength throughout L UE- ongoing 5. Pt will be able to lift light objects overhead for placing on shelves/cabinets- met 6. Pt will score no more than 28/50 on Quick DASH Discharge Goals: Time Frame: 6 weeks 1. Pt will demonstrate full overhead range of motion of L shoulder for ability to lift and carry objects overhead in function 2. Pt will demonstrate WNL B UE strength for ability to lift and carry heavy objects and bags 3. Pt will score no more than 15/50 on Quick DASH indicating excellent function of L UE 
4. Pt will be able to lift and carry his child with no restrictions or modifications 5. Pt will participate in gym workouts fully and comply with recommendations for specific HEP 6. Pt will participate in house and yardwork with max 10% restrictions or modifications Rehabilitation Potential For Stated Goals: Excellent The information in this section was collected on 5/31/18 (except where otherwise noted). HISTORY:  
History of Present Injury/Illness (Reason for Referral): Aba Herrera presents s/p left side RTC repair and debridement of bicep long-head, labral, articular cartilage and bursal tissues. He had been having a gradual onset of pain and went to MD to seek treatment; no specific injury.   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 No other significant medical history noted. Currently still wearing a sling, and pt reports that he was instructed by MD to go slow with his rehab to allow for proper healing. Pain location: L shoulder Pain levels - Current: 0/10, mild increases in pain with movement Increases pain: Movement Decreases pain: Rest 
 
Past Medical History/Comorbidities:  
Mr. Sarah Rodriguez  has a past medical history of Adverse effect of anesthesia; Chronic pain; Hypertension; and Migraine. Mr. Sarah Rodriguez  has a past surgical history that includes hx knee arthroscopy (Right, 09/1984); hx rotator cuff repair (Right, 2005); and hx back surgery (2005). 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   
  
Current Outpatient Prescriptions:  
  hydroCHLOROthiazide (HYDRODIURIL) 25 mg tablet, TAKE 1 TABLET BY MOUTH EVERY DAY, Disp: 90 Tab, Rfl: 0 
  sildenafil citrate (VIAGRA) 100 mg tablet, TAKE 1 TABLET BY MOUTH EVERY DAY AS NEEDED, Disp: 30 Tab, Rfl: 0 
  quinapril (ACCUPRIL) 10 mg tablet, TAKE 1 TABLET BY MOUTH EVERY DAY, Disp: 30 Tab, Rfl: 5 
  finasteride (PROPECIA) 1 mg tablet, TAKE 1 TABLET BY MOUTH DAILY, Disp: 90 Tab, Rfl: 0 
  acetaminophen/diphenhydramine (TYLENOL PM PO), Take 1 Tab by mouth daily. , Disp: , Rfl:  
  potassium 99 mg tablet, Take 99 mg by mouth daily. , Disp: , Rfl:  
  levocarnitine HCl (ACETYL-L-CARNITINE), Take 1 Tab by mouth daily. , Disp: , Rfl:  
  ascorbic acid, vitamin C, (VITAMIN C) 500 mg tablet, Take 500 mg by mouth daily. , Disp: , Rfl:  
  valACYclovir (VALTREX) 1 gram tablet, TAKE 1 TABLET BY MOUTH EVERY 12 HOURS FOR 10 DAYS (Patient taking differently: TAKE 1 TABLET BY MOUTH EVERY 12 HOURS FOR 10 DAYS+---prn), Disp: 20 Tab, Rfl: 2 
  aspirin 81 mg tablet, Take 81 mg by mouth daily. , Disp: , Rfl:  
  MULTIVITAMIN PO, Take 1 Tab by mouth daily. , Disp: , Rfl:  
  omega-3 fatty acids-vitamin e (FISH OIL) 1,000 mg Cap, Take 1,000 mg by mouth daily. , Disp: , Rfl:   
Date Last Reviewed:  9/4/2018 EXAMINATION:  
Measurements 9/4/18: MMT: L shoulder flexion, ER, IR: 4+/5  Abduction: 4/5 Re-evaluation 8/14/18: 
 
Shoulder AROM R L Flexion 160 138 Abduction 165 150 ER 75 65 IR WNL WNL Extension 75 65 Shoulder  R L Flexion 5 4- Abduction 5 4-  
ER 5 4-  
IR 5 4+ Extension 5 4 Mid Trap 5 4- Tests: Inferior capsule - moderate restriction most notably in abduction range of 90 degrees and above Initial evaluation: 
 
Observation:  Surgical incisions healing well, acromion is prominent where pt had previous shoulder separation, decreased muscle mass noted of L deltoids and infraspinatus Postural Assessment:  Rounded forward shoulders, L more than R Supine: tight pecs noted L>R Palpation: No tenderness noted grossly to L shoulder ROM:  - passive L shoulder flexion: 105 L shoulder ER: 35 
L shoulder IR:  65 Strength: Deferred to next progress note CLINICAL DECISION MAKING:  
Outcome Measure: Tool Used: Disabilities of the Arm, Shoulder and Hand (DASH) Questionnaire - Quick Version Score:  Initial: 40/55  Most Recent: X/55 (Date: -- ) Interpretation of Score: The DASH is designed to measure the activities of daily living in person's with upper extremity dysfunction or pain. Each section is scored on a 1-5 scale, 5 representing the greatest disability. The scores of each section are added together for a total score of 55. Medical Necessity:  
· Patient demonstrates good rehab potential due to higher previous functional level. Reason for Services/Other Comments: · Patient continues to require skilled intervention due to recent rotator cuff surgery impairing the function of the L arm. TREATMENT:  
(In addition to Assessment/Re-Assessment sessions the following treatments were rendered) Present Symptoms/Complaints - 2018:  Pt was about 20 minutes late for his appointment. He stated he is having a hard time getting here due to work and his . His shoulder is feeling good and he is doing exercises every day. Pain: Initial:  
Pain Intensity 1: 0  Post Session:  Unchanged Therapeutic Exercise: (40 minutes):  Exercises per grid below to improve mobility, strength and balance. Required moderate visual, verbal and tactile cues to promote proper body alignment, promote proper body posture and promote proper body mechanics. Progressed resistance, range and repetitions as indicated. Date: 
18 Date: 
18 Activity/Exercise Pt education UBE L4.0 5/5  L4.5 5/5 L4.5 5/5 L5.0 5/5 L5.0 5/5 Shoulder flexion AAROM  Stretch on wall 30s hold Pulley   Abduction  and IR behind back 20s holds x5 Rows BTB 30x     Staggered stance, 17# 2 x 15 Shld abduction S/lying, small ROM 2 x 10 1# 10x, 2# 10x 2# to 90 deg 20x 2# to 90 deg 20x, then full OH 20x 3# 3 x 10 full OH ROM Shld ER Sidelying 3 x 10 3# 2 x 10 Sidelying 3# 2 x 10 Bilateral RTB 3 x 10 Bilateral GTB 2 x 10 Shld IR  Diagonal motion 3# cable 10x Across body/diagonal, BTB 20x Across body/diagonal, BTB 20x Shld flexion  1# 10x, 2# 10x 2# 3 x 10 OH 3# 3 x 10 OH 3# 3 x 10 OH Abduction stretch against wall  30s hold OH press   3# 15x TRX row   20x 20x 20x Wall pushups    20x On big green ball 20x On table 2 x 15 Sidebend stretch arms OH    Long hold Long hold Prayer stretch    Long holds fwd and sideways Q-ped arm raises    Raise only R arm, short hold, 3 x 10 cue for serratus activation Raise only R arm, short hold, 2 x 10 cue for serratus activation Raise only R arm, short hold, 2 x 10 cue for serratus activation Ball bounce against wall     In full forward elevation ROM, green ball, 2 sets to fatigue Prone self-toss with ball (in 90/90 position)     Several minutes Standing Y      GTB 3 x 10 Ball circles on wall      Red ball 30x cc/cw abd and flex Leida Dobbs hold Treatment/Session Assessment:  Pt is progressing very well and demonstrates improved strength with testing today. We discussed his difficulty getting to appointments and we agreed to create a comprehensive home exercise program for him and to discharge next visit. He shows improved mechanics and strength with therex today, and with the correct progressions he will be ready to discharge. · Compliance with Program/Exercises: Good · Recommendations/Intent for next treatment session: DC next visit · Variance from plan of care: None HEP Access Code: [email or web address]  
URL: [email or web address]  
Exercises Seated Overhead Shoulder External Rotation Stretch with Towel - 10 reps Child's Pose Stretch - 10 reps - 3 sets - 4x weekly Child's Pose with Sidebending - 10 reps - 3 sets - 4x weekly Standing Shoulder Internal Rotation Stretch with Towel - 10 reps - 10s hold Shoulder Abduction with Dumbbells - Thumbs Up - 10 reps - 3 sets - 4x weekly Standing Shoulder Flexion Full Range - 10 reps - 3 sets - 4x weekly Sidelying Shoulder External Rotation - 10 reps - 3 sets - 1x daily TL Sidebending Stretch - Arms Overhead - 3 sets - 30s hold - 1x daily Shoulder Overhead Press in Flexion with Dumbbells - 10 reps - 3 sets - 4x weekly Standing Single Arm Shoulder PNF D1 Flexion with Anchored Resistance - 10 reps - 3 sets - 4x weekly Prone Shoulder Row - 10 reps - 3 sets Quadruped Alternating Arm Lift - 10 reps - 3 sets - 4x weekly Shoulder External Rotation and Scapular Retraction with Resistance - 10 reps - 3 sets - 4x weekly Wall Push Up with Arms Wide - 10 reps - 3 sets - 4x weekly Total Treatment Duration: PT Patient Time In/Time Out Time In: 4877 Time Out: 4243 Jojo Rodrigez, PT

## 2018-09-05 ENCOUNTER — HOSPITAL ENCOUNTER (OUTPATIENT)
Dept: PHYSICAL THERAPY | Age: 53
Discharge: HOME | End: 2018-09-05
Payer: COMMERCIAL

## 2018-09-05 PROCEDURE — 97110 THERAPEUTIC EXERCISES: CPT

## 2018-09-05 NOTE — THERAPY DISCHARGE
Aba Lara Олег : 1965 Payor: Diana Argue / Plan: [employer] / Product Type: HMO /  [address]  at [address] Germain Yang, [address] [place] [address] [phone]   [phone] OUTPATIENT PHYSICAL THERAPY:Daily Note and Discharge 2018 ICD-10: Treatment Diagnosis: Complete rotator cuff tear or rupture of left shoulder, not specified as traumatic (V38.133), Encounter for other orthopedic aftercare (Z47.89), Pain in left shoulder (M25.512), Stiffness of left shoulder, not elsewhere classified (M25.612), Muscle wasting and atrophy, not elsewhere classified, left shoulder (M62.512) Precautions/Allergies:  
Review of patient's allergies indicates no known allergies. Fall Risk Score: 1 (? 5 = High Risk) MD Orders: PT eval and treat MEDICAL/REFERRING DIAGNOSIS: 
shoulder, left DATE OF ONSET: surgery 18 REFERRING PHYSICIAN: Nisha Rosenthal MD 
RETURN PHYSICIAN APPOINTMENT: 18 ASSESSMENT:  Mr. Bella Fox participated in 7 visits of skilled PT and has met most of his goals. He still has deficits in rotator cuff strength particularly supraspinatus and also periscapular strength. However, he has made excellent improvements in terms of strength and his mechanics with exercise are very good, with few compensations. Ideally pt would have been seen for a few more weeks, however his work schedule and helping out with his family are making it difficult for him to get consistently get to PT. Therefore we agreed to set up a comprehensive 6-day per week HEP. Pt is now discharged from PT. TREATMENT PLAN - UPDATED ON 18: 
Effective Dates: 2018 TO 10/14/2018. Frequency/Duration: 2 times a week for 6 weeks GOALS: (Goals have been discussed and agreed upon with patient.) - UPDATED ON 18: 
Short-Term Functional Goals: Time Frame: 3 weeks 1. Pt will be I and compliant with initial HEP- met 2. Pt will demonstrate minimum 140 degrees AROM L shoulder flexion and abduction - met 3. Pt will demonstrate at least 75 degrees ER and IR of L shoulder- met 4. Pt will demonstrate at least 4/5 muscle strength throughout L UE- met 5. Pt will be able to lift light objects overhead for placing on shelves/cabinets- met 6. Pt will score no more than 28/50 on Quick DASH- met Discharge Goals: Time Frame: 6 weeks 1. Pt will demonstrate full overhead range of motion of L shoulder for ability to lift and carry objects overhead in function- met 2. Pt will demonstrate WNL B UE strength for ability to lift and carry heavy objects and bags- mostly met 3. Pt will score no more than 15/50 on Quick DASH indicating excellent function of L UE- met 4. Pt will be able to lift and carry his child with no restrictions or modifications- met 5. Pt will participate in gym workouts fully and comply with recommendations for specific HEP- met 6. Pt will participate in house and yardwork with max 10% restrictions or modifications- met The information in this section was collected on 5/31/18 (except where otherwise noted). HISTORY:  
History of Present Injury/Illness (Reason for Referral): Aba Taylor presents s/p left side RTC repair and debridement of bicep long-head, labral, articular cartilage and bursal tissues. He had been having a gradual onset of pain and went to MD to seek treatment; no specific injury. 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 No other significant medical history noted. Currently still wearing a sling, and pt reports that he was instructed by MD to go slow with his rehab to allow for proper healing. Pain location: L shoulder Pain levels - Current: 0/10, mild increases in pain with movement Increases pain: Movement Decreases pain: Rest 
 
Past Medical History/Comorbidities:  
Mr. Alessia Presley  has a past medical history of Adverse effect of anesthesia; Chronic pain; Hypertension; and Migraine. Mr. Alessia Presley  has a past surgical history that includes hx knee arthroscopy (Right, 09/1984); hx rotator cuff repair (Right, 2005); and hx back surgery (2005). Social History/Living Environment:   Lives with wife, expecting baby in July 2018 Prior Level of Function/Work/Activity: Office work, driving, enjoys bike and [employer] Functional Limitations: Limited use of L arm for lifting and movement Current Medications:   
  
Current Outpatient Prescriptions:  
  hydroCHLOROthiazide (HYDRODIURIL) 25 mg tablet, TAKE 1 TABLET BY MOUTH EVERY DAY, Disp: 90 Tab, Rfl: 0 
  sildenafil citrate (VIAGRA) 100 mg tablet, TAKE 1 TABLET BY MOUTH EVERY DAY AS NEEDED, Disp: 30 Tab, Rfl: 0 
  quinapril (ACCUPRIL) 10 mg tablet, TAKE 1 TABLET BY MOUTH EVERY DAY, Disp: 30 Tab, Rfl: 5 
  finasteride (PROPECIA) 1 mg tablet, TAKE 1 TABLET BY MOUTH DAILY, Disp: 90 Tab, Rfl: 0 
  acetaminophen/diphenhydramine (TYLENOL PM PO), Take 1 Tab by mouth daily. , Disp: , Rfl:  
  potassium 99 mg tablet, Take 99 mg by mouth daily. , Disp: , Rfl:  
  levocarnitine HCl (ACETYL-L-CARNITINE), Take 1 Tab by mouth daily. , Disp: , Rfl:  
  ascorbic acid, vitamin C, (VITAMIN C) 500 mg tablet, Take 500 mg by mouth daily. , Disp: , Rfl:  
  valACYclovir (VALTREX) 1 gram tablet, TAKE 1 TABLET BY MOUTH EVERY 12 HOURS FOR 10 DAYS (Patient taking differently: TAKE 1 TABLET BY MOUTH EVERY 12 HOURS FOR 10 DAYS+---prn), Disp: 20 Tab, Rfl: 2 
  aspirin 81 mg tablet, Take 81 mg by mouth daily. , Disp: , Rfl:  
  MULTIVITAMIN PO, Take 1 Tab by mouth daily. , Disp: , Rfl:  
  omega-3 fatty acids-vitamin e (FISH OIL) 1,000 mg Cap, Take 1,000 mg by mouth daily. , Disp: , Rfl:   
Date Last Reviewed:  9/5/2018 EXAMINATION:  
Measurements 9/4/18: MMT: L shoulder flexion, ER, IR: 4+/5  Abduction: 4/5 Re-evaluation 8/14/18: 
 
Shoulder AROM R L Flexion 160 138 Abduction 165 150 ER 75 65 IR WNL WNL Extension 75 65 Shoulder  R L Flexion 5 4- Abduction 5 4-  
ER 5 4-  
IR 5 4+ Extension 5 4 Mid Trap 5 4- Tests: Inferior capsule - moderate restriction most notably in abduction range of 90 degrees and above Initial evaluation: 
 
Observation:  Surgical incisions healing well, acromion is prominent where pt had previous shoulder separation, decreased muscle mass noted of L deltoids and infraspinatus Postural Assessment:  Rounded forward shoulders, L more than R Supine: tight pecs noted L>R Palpation: No tenderness noted grossly to L shoulder ROM:  - passive L shoulder flexion: 105 L shoulder ER: 35 
L shoulder IR:  65 Strength: Deferred to next progress note CLINICAL DECISION MAKING:  
Outcome Measure: Tool Used: Disabilities of the Arm, Shoulder and Hand (DASH) Questionnaire - Quick Version Score:  Initial: 40/55  Most Recent: 14/55 (Date: 9/5/18 ) Interpretation of Score: The DASH is designed to measure the activities of daily living in person's with upper extremity dysfunction or pain. Each section is scored on a 1-5 scale, 5 representing the greatest disability. The scores of each section are added together for a total score of 55. TREATMENT:  
(In addition to Assessment/Re-Assessment sessions the following treatments were rendered) Present Symptoms/Complaints - 9/5/2018:  Pt reports he is good and ready to discharge. Pain: Initial:  
Pain Intensity 1: 0  Post Session:  Unchanged Therapeutic Exercise: (40 minutes):  Exercises per grid below to improve mobility, strength and balance. Required moderate visual, verbal and tactile cues to promote proper body alignment, promote proper body posture and promote proper body mechanics.   Progressed resistance, range and repetitions as indicated. Date: 
8/14/18 Date: 
8/21/18 Date: 
8/23/18 Date: 
8/28/18 Date: 
9/4/18 Date: 
9/5/18 Activity/Exercise Pt education      Reviewed HEP, plank variations, TRX variations UBE  L4.5 5/5 L4.5 5/5 L5.0 5/5 L5.0 5/5 L6.2 5/5 Shoulder flexion AAROM Stretch on wall 30s hold Pulley  Abduction  and IR behind back 20s holds x5 Rows     Staggered stance, 17# 2 x 15 Staggered stance, 17# 2 x 10 Shld abduction 1# 10x, 2# 10x 2# to 90 deg 20x 2# to 90 deg 20x, then full OH 20x 3# 3 x 10 full OH ROM Shld ER 3# 2 x 10 Sidelying 3# 2 x 10 Bilateral RTB 3 x 10 Bilateral GTB 2 x 10 Shld IR Diagonal motion 3# cable 10x Across body/diagonal, BTB 20x Across body/diagonal, BTB 20x Shld flexion 1# 10x, 2# 10x 2# 3 x 10 OH 3# 3 x 10 OH 3# 3 x 10 OH Abduction stretch against wall 30s hold OH press  3# 15x TRX row  20x 20x 20x  TRX tricep pushup x10 Wall pushups   20x On big green ball 20x On table 2 x 15 Sidebend stretch arms OH   Long hold Long hold Prayer stretch   Long holds fwd and sideways Q-ped arm raises   Raise only R arm, short hold, 3 x 10 cue for serratus activation Raise only R arm, short hold, 2 x 10 cue for serratus activation Raise only R arm, short hold, 2 x 10 cue for serratus activation Ball bounce against wall    In full forward elevation ROM, green ball, 2 sets to fatigue Prone self-toss with ball (in 90/90 position)    Several minutes Standing Y     GTB 3 x 10 GTB 2 x 10 Ball circles on wall     Red ball 30x cc/cw abd and flex Isamar Kub hold Prone T      2 x 10 L Prone W      2 x 10 B Treatment/Session Assessment:  Pt understands HEP well. HEP Access Code: [email or web address]  
URL: [email or web address]  
Date: 09/05/2018 Prepared by: Yamile Sang Exercises Seated Overhead Shoulder External Rotation Stretch with Towel - 10 reps Child's Pose Stretch - 10 reps - 3 sets - 4x weekly Child's Pose with Sidebending - 10 reps - 3 sets - 4x weekly Standing Shoulder Internal Rotation Stretch with Towel - 10 reps - 10s hold Shoulder Abduction with Dumbbells - Thumbs Up - 10 reps - 3 sets - 4x weekly Standing Shoulder Flexion Full Range - 10 reps - 3 sets - 4x weekly Sidelying Shoulder External Rotation - 10 reps - 3 sets - 1x daily TL Sidebending Stretch - Arms Overhead - 3 sets - 30s hold - 1x daily Shoulder Overhead Press in Flexion with Dumbbells - 10 reps - 3 sets - 4x weekly Standing Single Arm Shoulder PNF D1 Flexion with Anchored Resistance - 10 reps - 3 sets - 4x weekly Prone Shoulder Row - 10 reps - 3 sets Quadruped Alternating Arm Lift - 10 reps - 3 sets - 4x weekly Shoulder External Rotation and Scapular Retraction with Resistance - 10 reps - 3 sets - 4x weekly Wall Push Up with Arms Wide - 10 reps - 3 sets - 4x weekly Standing Low Trap Setting with Resistance at [address] 10 reps - 3 sets - 4x weekly Standing Wall [employer] with Bruce Soup - 10 reps - 3 sets - 4x weekly Standing Wall [employer] in [place] with Mini Swiss Ball - 10 reps - 3 sets - 4x weekly Full Plank - 10 reps - 3 sets - 4x weekly Prone Shoulder Horizontal Abduction - 10 reps - 3 sets - 4x weekly Prone W Scapular Retraction - 10 reps - 3 sets - 4x weekly Total Treatment Duration: PT Patient Time In/Time Out Time In: 1630 Time Out: 1715 Kiara Gallardo, PT

## 2018-09-06 ENCOUNTER — APPOINTMENT (OUTPATIENT)
Dept: PHYSICAL THERAPY | Age: 53
End: 2018-09-06
Payer: COMMERCIAL

## 2019-05-16 PROBLEM — J20.9 ACUTE BRONCHITIS: Status: ACTIVE | Noted: 2019-05-16

## 2019-05-16 PROBLEM — I10 BENIGN ESSENTIAL HTN: Status: ACTIVE | Noted: 2019-05-16

## 2020-08-28 PROBLEM — G43.009 MIGRAINE WITHOUT AURA AND WITHOUT STATUS MIGRAINOSUS, NOT INTRACTABLE: Status: ACTIVE | Noted: 2020-08-28

## 2021-04-09 NOTE — PROGRESS NOTES
Confirmed arrival time and procedure with patient and asked that he have a  that can stay the entire time and not leave the facility until he is ready for discharge. Patient understood.

## 2021-04-12 ENCOUNTER — HOSPITAL ENCOUNTER (OUTPATIENT)
Age: 56
Setting detail: OUTPATIENT SURGERY
Discharge: HOME | End: 2021-04-12
Attending: SURGERY | Admitting: SURGERY
Payer: COMMERCIAL

## 2021-04-12 ENCOUNTER — ANESTHESIA EVENT (OUTPATIENT)
Dept: ENDOSCOPY | Age: 56
End: 2021-04-12
Payer: COMMERCIAL

## 2021-04-12 ENCOUNTER — ANESTHESIA (OUTPATIENT)
Dept: ENDOSCOPY | Age: 56
End: 2021-04-12
Payer: COMMERCIAL

## 2021-04-12 VITALS
TEMPERATURE: 97.7 F | WEIGHT: 190 LBS | BODY MASS INDEX: 25.18 KG/M2 | OXYGEN SATURATION: 100 % | HEART RATE: 59 BPM | HEIGHT: 73 IN | DIASTOLIC BLOOD PRESSURE: 77 MMHG | RESPIRATION RATE: 17 BRPM | SYSTOLIC BLOOD PRESSURE: 126 MMHG

## 2021-04-12 DIAGNOSIS — Z12.11 COLON CANCER SCREENING: ICD-10-CM

## 2021-04-12 PROCEDURE — 2709999900 HC NON-CHARGEABLE SUPPLY: Performed by: SURGERY

## 2021-04-12 PROCEDURE — 74011250636 HC RX REV CODE- 250/636: Performed by: NURSE ANESTHETIST, CERTIFIED REGISTERED

## 2021-04-12 PROCEDURE — 74011000250 HC RX REV CODE- 250: Performed by: NURSE ANESTHETIST, CERTIFIED REGISTERED

## 2021-04-12 PROCEDURE — 76040000025: Performed by: SURGERY

## 2021-04-12 PROCEDURE — 76060000031 HC ANESTHESIA FIRST 0.5 HR: Performed by: SURGERY

## 2021-04-12 PROCEDURE — 45378 DIAGNOSTIC COLONOSCOPY: CPT | Performed by: SURGERY

## 2021-04-12 RX ORDER — SODIUM CHLORIDE, SODIUM LACTATE, POTASSIUM CHLORIDE, CALCIUM CHLORIDE 600; 310; 30; 20 MG/100ML; MG/100ML; MG/100ML; MG/100ML
100 INJECTION, SOLUTION INTRAVENOUS CONTINUOUS
Status: CANCELLED | OUTPATIENT
Start: 2021-04-12

## 2021-04-12 RX ORDER — PROPOFOL 10 MG/ML
INJECTION, EMULSION INTRAVENOUS AS NEEDED
Status: DISCONTINUED | OUTPATIENT
Start: 2021-04-12 | End: 2021-04-12 | Stop reason: HOSPADM

## 2021-04-12 RX ORDER — LIDOCAINE HYDROCHLORIDE 20 MG/ML
INJECTION, SOLUTION EPIDURAL; INFILTRATION; INTRACAUDAL; PERINEURAL AS NEEDED
Status: DISCONTINUED | OUTPATIENT
Start: 2021-04-12 | End: 2021-04-12 | Stop reason: HOSPADM

## 2021-04-12 RX ORDER — SODIUM CHLORIDE 0.9 % (FLUSH) 0.9 %
5-40 SYRINGE (ML) INJECTION AS NEEDED
Status: CANCELLED | OUTPATIENT
Start: 2021-04-12

## 2021-04-12 RX ORDER — SODIUM CHLORIDE, SODIUM LACTATE, POTASSIUM CHLORIDE, CALCIUM CHLORIDE 600; 310; 30; 20 MG/100ML; MG/100ML; MG/100ML; MG/100ML
INJECTION, SOLUTION INTRAVENOUS
Status: DISCONTINUED | OUTPATIENT
Start: 2021-04-12 | End: 2021-04-12 | Stop reason: HOSPADM

## 2021-04-12 RX ORDER — PROPOFOL 10 MG/ML
INJECTION, EMULSION INTRAVENOUS
Status: DISCONTINUED | OUTPATIENT
Start: 2021-04-12 | End: 2021-04-12 | Stop reason: HOSPADM

## 2021-04-12 RX ORDER — SODIUM CHLORIDE 0.9 % (FLUSH) 0.9 %
5-40 SYRINGE (ML) INJECTION EVERY 8 HOURS
Status: CANCELLED | OUTPATIENT
Start: 2021-04-12

## 2021-04-12 RX ADMIN — PROPOFOL 180 MCG/KG/MIN: 10 INJECTION, EMULSION INTRAVENOUS at 08:50

## 2021-04-12 RX ADMIN — LIDOCAINE HYDROCHLORIDE 100 MG: 20 INJECTION, SOLUTION EPIDURAL; INFILTRATION; INTRACAUDAL; PERINEURAL at 08:50

## 2021-04-12 RX ADMIN — PROPOFOL 60 MG: 10 INJECTION, EMULSION INTRAVENOUS at 08:50

## 2021-04-12 RX ADMIN — SODIUM CHLORIDE, SODIUM LACTATE, POTASSIUM CHLORIDE, AND CALCIUM CHLORIDE: 600; 310; 30; 20 INJECTION, SOLUTION INTRAVENOUS at 08:50

## 2021-04-12 NOTE — ANESTHESIA POSTPROCEDURE EVALUATION
Procedure(s):  COLONOSCOPY/ 26.    total IV anesthesia    Anesthesia Post Evaluation        Patient location during evaluation: PACU  Patient participation: complete - patient participated  Level of consciousness: awake  Pain management: adequate  Airway patency: patent  Anesthetic complications: no  Cardiovascular status: acceptable  Respiratory status: acceptable  Hydration status: acceptable  Post anesthesia nausea and vomiting:  none      INITIAL Post-op Vital signs:   Vitals Value Taken Time   /77 04/12/21 0946   Temp 36.5 °C (97.7 °F) 04/12/21 0916   Pulse 59 04/12/21 0946   Resp 17 04/12/21 0946   SpO2 100 % 04/12/21 0946

## 2021-04-12 NOTE — PROCEDURES
Procedure in Detail:  Informed consent was obtained for the procedure. The patient was placed in the left lateral decubitus position and sedation was induced by anesthesia. The scope was inserted into the rectum and advanced under direct vision to the cecum, which was identified by the ileocecal valve and appendiceal orifice. The quality of the colonic preparation was excellent. A careful inspection was made as the colonoscope was withdrawn, including a retroflexed view of the rectum; findings and interventions are described below. Appropriate photodocumentation was obtained. Findings:   ANUS: Anal exam reveals no masses or hemorrhoids, sphincter tone is normal.   RECTUM: Rectal exam reveals no masses or hemorrhoids. SIGMOID COLON: The mucosa is normal with good vascular pattern and without ulcers, diverticula, and polyps. DESCENDING COLON: The mucosa is normal with good vascular pattern and without ulcers, diverticula, and polyps. SPLENIC FLEXURE: The splenic flexure is normal.   TRANSVERSE COLON: The mucosa is normal with good vascular pattern and without ulcers, diverticula, and polyps. HEPATIC FLEXURE: The hepatic flexure is normal.   ASCENDING COLON: The mucosa is normal with good vascular pattern and without ulcers, diverticula, and polyps. CECUM: The appendiceal orifice appears normal. The ileocecal valve appears normal.   TERMINAL ILEUM: The terminal ileum was not entered. Specimens: No specimens were collected. Complications: None; patient tolerated the procedure well. \    EBL - none    Recommendations:   - For colon cancer screening in this average-risk patient, colonoscopy may be repeated in 10 years.      Signed By: Yordy March MD                        April 12, 2021

## 2021-04-12 NOTE — H&P
History and Physical    History and Physical      Patient: Brigid Ryder    Physician: Perry Melendrez MD    Referring Physician: Zoë Patel MD    Chief Complaint: For colonoscopy    History of Present Illness: Pt presents for colonoscopy. Initial screening; had colonoscopy 20 yrs ago, in [place], due to unexplained weight loss with no findings by his report. History:  Past Medical History:   Diagnosis Date    Chronic pain     L shoulder since 9/2017    Hypertension     managed with med.  Migraine      Past Surgical History:   Procedure Laterality Date    HX BACK SURGERY  2005    decompression of sciatic nerve L4-L5    HX KNEE ARTHROSCOPY Right 09/1984    HX ROTATOR CUFF REPAIR Right 2005    x 2      Social History     Socioeconomic History    Marital status:      Spouse name: Not on file    Number of children: Not on file    Years of education: Not on file    Highest education level: Not on file   Tobacco Use    Smoking status: Never Smoker    Smokeless tobacco: Never Used   Substance and Sexual Activity    Alcohol use: Yes     Alcohol/week: 4.0 standard drinks     Types: 4 Glasses of wine per week     Comment: every 2-3 days    Drug use: Never    Sexual activity: Yes     Partners: Female     Birth control/protection: None      Family History   Problem Relation Age of Onset    Migraines Mother     Coronary Artery Disease Father     Heart Disease Father     Heart Attack Father     Heart Surgery Father     Stroke Maternal Grandmother     Cancer Maternal Grandfather         breast       Medications:   Prior to Admission medications    Medication Sig Start Date End Date Taking? Authorizing Provider   metoprolol succinate (TOPROL-XL) 50 mg XL tablet TAKE 1 TABLET BY MOUTH EVERY DAY 9/14/20  Yes Zoë Patel MD   sildenafil citrate (VIAGRA) 100 mg tablet Take 1 Tab by mouth as needed for Erectile Dysfunction.  8/28/20  Yes Zoë Patel MD finasteride (PROPECIA) 1 mg tablet Take 1 Tab by mouth daily. 5/13/20  Yes Aleida Carpio MD   Blood Pressure Kit-Extra Large kit USE TO CHECK BLOOD PRESSURE 1/17/19  Yes Pari Wilson MD   aspirin 81 mg tablet Take 81 mg by mouth daily. 8/30/10  Yes Provider, Historical   MULTIVITAMIN PO Take 1 Tab by mouth daily. 8/30/10  Yes Provider, Historical   omega-3 fatty acids-vitamin e (FISH OIL) 1,000 mg Cap Take 1,000 mg by mouth daily. 8/30/10  Yes Provider, Historical       Allergies: No Known Allergies    Physical Exam:     Vital Signs:   Visit Vitals  BP (!) 155/89   Pulse 60   Temp 98 °F (36.7 °C)   Ht 6' 1\" (1.854 m)   Wt 190 lb (86.2 kg)   SpO2 99%   BMI 25.07 kg/m²     . General: no distress      Heart: regular   Lungs: unlabored   Abdominal: soft   Neurological: Grossly normal        Findings/Diagnosis: Screening for colorectal cancer     Plan of Care/Planned Procedure: Colonoscopy, possible polypectomy. Pt/designee has reviewed the colonoscopy information sheet. Any questions have been discussed. They agree to proceed.       Signed:  Fadi Smith MD   4/12/2021

## 2021-04-12 NOTE — ROUTINE PROCESS
VSS at discharge. No complaints noted. Education reviewed and signed with patient and his step-father. Pt discharged via wheelchair by Manuel Floyd RN. Patient to be driven home by his step-father, Meena Heath.

## 2021-04-12 NOTE — DISCHARGE INSTRUCTIONS
Satish Marrero M.D.  [phone]    Instructions following colonoscopy:    ACTIVITY:   Resume usual, basic activities around the house today.  You may be light-headed or sleepy from anesthesia, so be careful going up and down stairs.  Avoid driving, operating machinery, or signing documents for 24 hours. DIET:   No restriction. Please note, some people may have nausea or cramps after this procedure which can result in an upset stomach after eating.  Many people have loose stools or diarrhea immediately after colonoscopy. It is also not uncommon to not have a bowel movement for 2-3 days. PAIN:   Some cramping or gas pain is normal after colonoscopy. However, if you experience worsening pain over the course of the day, or pain with associated fever please call the office immediately      [id number] IF:   You have a temperature higher than 101.5° Fahrenheit for more than 6 hours.  You have severe nausea or vomiting not relieved by medication; or diarrhea. Continue home medications as previously prescribed.     Repeat screening colonoscopy in ten years

## 2021-04-12 NOTE — ANESTHESIA PREPROCEDURE EVALUATION
Anesthetic History   No history of anesthetic complications            Review of Systems / Medical History  Patient summary reviewed and pertinent labs reviewed    Pulmonary  Within defined limits                 Neuro/Psych         Headaches     Cardiovascular    Hypertension              Exercise tolerance: >4 METS     GI/Hepatic/Renal                Endo/Other        Arthritis     Other Findings              Physical Exam    Airway  Mallampati: II  TM Distance: 4 - 6 cm  Neck ROM: normal range of motion   Mouth opening: Normal     Cardiovascular    Rhythm: regular  Rate: normal      Pertinent negatives: No murmur, JVD and peripheral edema   Dental  No notable dental hx       Pulmonary  Breath sounds clear to auscultation               Abdominal         Other Findings            Anesthetic Plan    ASA: 2  Anesthesia type: total IV anesthesia          Induction: Intravenous  Anesthetic plan and risks discussed with: Patient

## (undated) DEVICE — (D)STRIP SKN CLSR 0.5X4IN WHT --

## (undated) DEVICE — CONTAINER,SPECIMEN,OR STERILE,4OZ: Brand: MEDLINE

## (undated) DEVICE — BLADE SHV CUT MENIS AGG + 4MM --

## (undated) DEVICE — SYR 3ML LL TIP 1/10ML GRAD --

## (undated) DEVICE — SYR 50ML LR LCK 1ML GRAD NSAF --

## (undated) DEVICE — KENDALL RADIOLUCENT FOAM MONITORING ELECTRODE RECTANGULAR SHAPE: Brand: KENDALL

## (undated) DEVICE — CONNECTOR TBNG OD5-7MM O2 END DISP

## (undated) DEVICE — OUTFLOW CASSETTE TUBING, DO NOT USE IF PACKAGE IS DAMAGED: Brand: CROSSFLOW

## (undated) DEVICE — 4-PORT MANIFOLD: Brand: NEPTUNE 2

## (undated) DEVICE — CANNULA NSL ORAL AD FOR CAPNOFLEX CO2 O2 AIRLFE

## (undated) DEVICE — PACK,SHOULDER,DRAPE,POUCH: Brand: MEDLINE

## (undated) DEVICE — SOLUTION IRRIG 3000ML 0.9% SOD CHL FLX CONT 0797208] ICU MEDICAL INC]

## (undated) DEVICE — SYR 5ML 1/5 GRAD LL NSAF LF --

## (undated) DEVICE — (D)PREP SKN CHLRAPRP APPL 26ML -- CONVERT TO ITEM 371833

## (undated) DEVICE — 3M™ IOBAN™ 2 ANTIMICROBIAL INCISE DRAPE 6650EZ: Brand: IOBAN™ 2

## (undated) DEVICE — Device

## (undated) DEVICE — 90-S ACCELERATOR, SUCTION PROBE, NON-BENDABLE, MAX CUT LEVEL 11: Brand: SERFAS ENERGY

## (undated) DEVICE — CLEAR-TRAC 7.0 MM X 72 MM THREADED                                    CANNULA, WITH DISPOSABLE OBTURATOR,                                    GREY, STERILE: Brand: CLEAR-TRAC

## (undated) DEVICE — TRUEPASS DISPOSABLE NEEDLES 5 PER BOX: Brand: TRUEPASS

## (undated) DEVICE — NDL SPNE QNCKE 18GX3.5IN LF --

## (undated) DEVICE — SURGICAL PROCEDURE PACK BASIC ST FRANCIS

## (undated) DEVICE — SOFT SILICONE HYDROCELLULAR FOAM DRESSING WITH LOCK AWAY LAYER: Brand: ALLEVYN LIFE XL 21X21 CTN10

## (undated) DEVICE — ULTRATAPE SUTURE COBRAID BLUE, 6                                    PER BOX: Brand: ULTRATAPE

## (undated) DEVICE — KENDALL SCD EXPRESS SLEEVES, KNEE LENGTH, MEDIUM: Brand: KENDALL SCD

## (undated) DEVICE — INFLOW CASSETTE TUBING, DO NOT USE IF PACKAGE IS DAMAGED: Brand: CROSSFLOW

## (undated) DEVICE — NDL PRT INJ NSAF BLNT 18GX1.5 --

## (undated) DEVICE — CANNULA THREADED FLEX 6.5 X 72MM: Brand: CLEAR-TRAC

## (undated) DEVICE — ULTRATAPE 2MM BLUE 38 PKG OF 6

## (undated) DEVICE — INTENDED FOR TISSUE SEPARATION, AND OTHER PROCEDURES THAT REQUIRE A SHARP SURGICAL BLADE TO PUNCTURE OR CUT.: Brand: BARD-PARKER ® STAINLESS STEEL BLADES

## (undated) DEVICE — [AGGRESSIVE 6-FLUTE BARREL BUR, ARTHROSCOPIC SHAVER BLADE,  DO NOT RESTERILIZE,  DO NOT USE IF PACKAGE IS DAMAGED,  KEEP DRY,  KEEP AWAY FROM SUNLIGHT]: Brand: FORMULA

## (undated) DEVICE — MASTISOL ADHESIVE LIQ 2/3ML

## (undated) DEVICE — SUTURE MCRYL SZ 3-0 L27IN ABSRB UD L19MM PS-2 3/8 CIR PRIM Y427H